# Patient Record
Sex: FEMALE | Race: WHITE | NOT HISPANIC OR LATINO | Employment: FULL TIME | URBAN - NONMETROPOLITAN AREA
[De-identification: names, ages, dates, MRNs, and addresses within clinical notes are randomized per-mention and may not be internally consistent; named-entity substitution may affect disease eponyms.]

---

## 2023-05-02 LAB
CHOLESTEROL (MG/DL) IN SER/PLAS: 216 MG/DL (ref 0–199)
CHOLESTEROL IN HDL (MG/DL) IN SER/PLAS: 58 MG/DL
CHOLESTEROL/HDL RATIO: 3.7
LDL: 122 MG/DL (ref 0–99)
TRIGLYCERIDE (MG/DL) IN SER/PLAS: 181 MG/DL (ref 0–149)
VLDL: 36 MG/DL (ref 0–40)

## 2023-05-19 ENCOUNTER — TELEPHONE (OUTPATIENT)
Dept: PRIMARY CARE | Facility: CLINIC | Age: 53
End: 2023-05-19
Payer: COMMERCIAL

## 2023-05-19 DIAGNOSIS — I10 MILD HYPERTENSION: ICD-10-CM

## 2023-05-19 DIAGNOSIS — J45.909 MILD ASTHMA, UNSPECIFIED WHETHER COMPLICATED, UNSPECIFIED WHETHER PERSISTENT (HHS-HCC): ICD-10-CM

## 2023-05-19 RX ORDER — METOPROLOL SUCCINATE 50 MG/1
TABLET, EXTENDED RELEASE ORAL
COMMUNITY
Start: 2020-09-14 | End: 2023-05-19 | Stop reason: SDUPTHER

## 2023-05-19 RX ORDER — METOPROLOL SUCCINATE 50 MG/1
50 TABLET, EXTENDED RELEASE ORAL 2 TIMES DAILY
Qty: 270 TABLET | Refills: 1 | Status: SHIPPED | OUTPATIENT
Start: 2023-05-19 | End: 2023-06-19 | Stop reason: SDUPTHER

## 2023-05-19 RX ORDER — FLUTICASONE PROPIONATE 50 UG/1
1 POWDER, METERED RESPIRATORY (INHALATION)
Qty: 3 EACH | Refills: 1 | Status: SHIPPED | OUTPATIENT
Start: 2023-05-19 | End: 2023-05-19

## 2023-05-19 RX ORDER — FLUTICASONE PROPIONATE 50 UG/1
POWDER, METERED RESPIRATORY (INHALATION) 2 TIMES DAILY
COMMUNITY
Start: 2020-05-21 | End: 2023-05-19 | Stop reason: SDUPTHER

## 2023-05-23 DIAGNOSIS — F43.0 STRESS RESPONSE: ICD-10-CM

## 2023-05-23 RX ORDER — VENLAFAXINE HYDROCHLORIDE 75 MG/1
1 CAPSULE, EXTENDED RELEASE ORAL DAILY
COMMUNITY
Start: 2022-05-27 | End: 2023-05-23 | Stop reason: SDUPTHER

## 2023-05-23 RX ORDER — VENLAFAXINE HYDROCHLORIDE 75 MG/1
75 CAPSULE, EXTENDED RELEASE ORAL DAILY
Qty: 90 CAPSULE | Refills: 1 | Status: SHIPPED | OUTPATIENT
Start: 2023-05-23 | End: 2023-08-29 | Stop reason: SDUPTHER

## 2023-05-26 DIAGNOSIS — J45.909 MILD ASTHMA, UNSPECIFIED WHETHER COMPLICATED, UNSPECIFIED WHETHER PERSISTENT (HHS-HCC): Primary | ICD-10-CM

## 2023-05-26 RX ORDER — LISINOPRIL 20 MG/1
20 TABLET ORAL DAILY
COMMUNITY
Start: 2022-12-19 | End: 2023-07-10 | Stop reason: SDUPTHER

## 2023-05-26 RX ORDER — LISINOPRIL 10 MG/1
10 TABLET ORAL DAILY
COMMUNITY
Start: 2022-05-12 | End: 2023-06-19 | Stop reason: ALTCHOICE

## 2023-05-26 RX ORDER — FAMOTIDINE 20 MG/1
20 TABLET, FILM COATED ORAL 2 TIMES DAILY
COMMUNITY
End: 2023-12-18 | Stop reason: SDUPTHER

## 2023-05-26 RX ORDER — ALBUTEROL SULFATE 90 UG/1
1 AEROSOL, METERED RESPIRATORY (INHALATION) EVERY 4 HOURS PRN
COMMUNITY

## 2023-05-26 RX ORDER — PHENOL 1.4 %
1 AEROSOL, SPRAY (ML) MUCOUS MEMBRANE DAILY
COMMUNITY
Start: 2022-04-28

## 2023-05-26 RX ORDER — MONTELUKAST SODIUM 10 MG/1
10 TABLET ORAL EVERY EVENING
Qty: 90 TABLET | Refills: 1 | Status: SHIPPED | OUTPATIENT
Start: 2023-05-26 | End: 2023-05-26

## 2023-05-26 RX ORDER — MONTELUKAST SODIUM 10 MG/1
10 TABLET ORAL EVERY EVENING
COMMUNITY
Start: 2020-05-18 | End: 2023-05-26 | Stop reason: SDUPTHER

## 2023-06-15 PROBLEM — N95.1 PERIMENOPAUSAL SYMPTOM: Status: ACTIVE | Noted: 2023-06-15

## 2023-06-15 PROBLEM — N89.8 LEUKORRHEA: Status: ACTIVE | Noted: 2023-06-15

## 2023-06-15 PROBLEM — K21.9 GERD (GASTROESOPHAGEAL REFLUX DISEASE): Status: ACTIVE | Noted: 2023-06-15

## 2023-06-15 PROBLEM — E04.1 THYROID NODULE: Status: ACTIVE | Noted: 2023-06-15

## 2023-06-15 PROBLEM — J45.909 MILD ASTHMA (HHS-HCC): Status: ACTIVE | Noted: 2023-06-15

## 2023-06-15 PROBLEM — R73.9 HYPERGLYCEMIA: Status: ACTIVE | Noted: 2023-06-15

## 2023-06-15 PROBLEM — R09.82 PND (POST-NASAL DRIP): Status: ACTIVE | Noted: 2023-06-15

## 2023-06-15 PROBLEM — I10 MILD HYPERTENSION: Status: ACTIVE | Noted: 2023-06-15

## 2023-06-15 PROBLEM — I49.3 PVC (PREMATURE VENTRICULAR CONTRACTION): Status: ACTIVE | Noted: 2023-06-15

## 2023-06-19 ENCOUNTER — OFFICE VISIT (OUTPATIENT)
Dept: PRIMARY CARE | Facility: CLINIC | Age: 53
End: 2023-06-19
Payer: COMMERCIAL

## 2023-06-19 VITALS
HEIGHT: 65 IN | BODY MASS INDEX: 35.84 KG/M2 | HEART RATE: 71 BPM | OXYGEN SATURATION: 98 % | SYSTOLIC BLOOD PRESSURE: 128 MMHG | DIASTOLIC BLOOD PRESSURE: 74 MMHG | WEIGHT: 215.1 LBS

## 2023-06-19 DIAGNOSIS — E04.1 THYROID NODULE: ICD-10-CM

## 2023-06-19 DIAGNOSIS — N95.1 PERIMENOPAUSAL SYMPTOM: ICD-10-CM

## 2023-06-19 DIAGNOSIS — I10 MILD HYPERTENSION: Primary | ICD-10-CM

## 2023-06-19 DIAGNOSIS — E78.2 MIXED HYPERLIPIDEMIA: ICD-10-CM

## 2023-06-19 DIAGNOSIS — I49.3 PVC (PREMATURE VENTRICULAR CONTRACTION): ICD-10-CM

## 2023-06-19 DIAGNOSIS — R73.9 HYPERGLYCEMIA: ICD-10-CM

## 2023-06-19 PROBLEM — N89.8 LEUKORRHEA: Status: RESOLVED | Noted: 2023-06-15 | Resolved: 2023-06-19

## 2023-06-19 LAB
ANION GAP IN SER/PLAS: 13 MMOL/L (ref 10–20)
CALCIUM (MG/DL) IN SER/PLAS: 9.1 MG/DL (ref 8.6–10.3)
CARBON DIOXIDE, TOTAL (MMOL/L) IN SER/PLAS: 25 MMOL/L (ref 21–32)
CHLORIDE (MMOL/L) IN SER/PLAS: 106 MMOL/L (ref 98–107)
CREATININE (MG/DL) IN SER/PLAS: 0.65 MG/DL (ref 0.5–1.05)
GFR FEMALE: >90 ML/MIN/1.73M2
GLUCOSE (MG/DL) IN SER/PLAS: 106 MG/DL (ref 74–99)
POTASSIUM (MMOL/L) IN SER/PLAS: 4.1 MMOL/L (ref 3.5–5.3)
SODIUM (MMOL/L) IN SER/PLAS: 140 MMOL/L (ref 136–145)
UREA NITROGEN (MG/DL) IN SER/PLAS: 10 MG/DL (ref 6–23)

## 2023-06-19 PROCEDURE — 99213 OFFICE O/P EST LOW 20 MIN: CPT | Performed by: INTERNAL MEDICINE

## 2023-06-19 PROCEDURE — 3074F SYST BP LT 130 MM HG: CPT | Performed by: INTERNAL MEDICINE

## 2023-06-19 PROCEDURE — 1036F TOBACCO NON-USER: CPT | Performed by: INTERNAL MEDICINE

## 2023-06-19 PROCEDURE — 3078F DIAST BP <80 MM HG: CPT | Performed by: INTERNAL MEDICINE

## 2023-06-19 RX ORDER — METOPROLOL SUCCINATE 50 MG/1
TABLET, EXTENDED RELEASE ORAL
Qty: 270 TABLET | Refills: 1 | Status: SHIPPED | OUTPATIENT
Start: 2023-06-19 | End: 2023-11-14 | Stop reason: SDUPTHER

## 2023-06-19 ASSESSMENT — ENCOUNTER SYMPTOMS
NAUSEA: 0
DIARRHEA: 0
PALPITATIONS: 0
ABDOMINAL PAIN: 0
COUGH: 0
SHORTNESS OF BREATH: 0
FATIGUE: 0
WHEEZING: 0
BLOOD IN STOOL: 0
BACK PAIN: 0
CHEST TIGHTNESS: 0
ARTHRALGIAS: 0
VOMITING: 0

## 2023-06-19 ASSESSMENT — PATIENT HEALTH QUESTIONNAIRE - PHQ9
1. LITTLE INTEREST OR PLEASURE IN DOING THINGS: NOT AT ALL
2. FEELING DOWN, DEPRESSED OR HOPELESS: NOT AT ALL
SUM OF ALL RESPONSES TO PHQ9 QUESTIONS 1 AND 2: 0

## 2023-06-19 NOTE — PATIENT INSTRUCTIONS
We will contact you with your hemoglobin A1c result when it comes back  We would like to see you back in approximately 6 months for follow-up and please remember to get fasting lab work prior to that visit.  We will be checking your sugar, kidney profile, lipid profile, and hemoglobin A1c

## 2023-06-19 NOTE — PROGRESS NOTES
Subjective   Patient ID: Tamela Dow is a 53 y.o. female who presents for No chief complaint on file..  HPI  She is here today for her routine checkup.  She is looking well and reports feeling well.  Her blood pressure remains under good control.  She states she has been working on nutrition and exercise.  She is working out with her daughter and she is gradually increasing her stamina and her strength through training.  We also discussed her past medical problems and she continues to follow closely with the endocrinologist for her thyroid nodules.  Everything has remained stable at this point.  She also sees Dr. Pablo for her history of PVCs and so far she is doing well.  We also reviewed her most recent laboratory test results which she was able to get done this morning.  Her fasting glucose was slightly raised at 106 and her hemoglobin A1c is pending.  We also reviewed her medications and she takes the Effexor for perimenopausal symptoms which has been successful.  We decided to make no changes in medication and if everything goes according to plan we will see her back in 6 months for reevaluation  Review of Systems   Constitutional:  Negative for fatigue.   Respiratory:  Negative for cough, chest tightness, shortness of breath and wheezing.    Cardiovascular:  Negative for chest pain, palpitations and leg swelling.   Gastrointestinal:  Negative for abdominal pain, blood in stool, diarrhea, nausea and vomiting.   Musculoskeletal:  Negative for arthralgias and back pain.     Objective   Physical Exam  Vitals and nursing note reviewed.   Constitutional:       General: She is not in acute distress.     Appearance: Normal appearance.   HENT:      Head: Normocephalic and atraumatic.   Eyes:      Conjunctiva/sclera: Conjunctivae normal.   Cardiovascular:      Rate and Rhythm: Normal rate and regular rhythm.      Heart sounds: Normal heart sounds.   Pulmonary:      Effort: No respiratory distress.      Breath sounds:  No wheezing.   Abdominal:      Palpations: Abdomen is soft.      Tenderness: There is no abdominal tenderness. There is no guarding.   Musculoskeletal:         General: No swelling. Normal range of motion.   Skin:     General: Skin is warm and dry.   Neurological:      General: No focal deficit present.      Mental Status: She is alert and oriented to person, place, and time.   Psychiatric:         Behavior: Behavior normal.       Recent Results (from the past 672 hour(s))   Basic Metabolic Panel    Collection Time: 06/19/23 10:47 AM   Result Value Ref Range    Glucose 106 (H) 74 - 99 mg/dL    Sodium 140 136 - 145 mmol/L    Potassium 4.1 3.5 - 5.3 mmol/L    Chloride 106 98 - 107 mmol/L    Bicarbonate 25 21 - 32 mmol/L    Anion Gap 13 10 - 20 mmol/L    Urea Nitrogen 10 6 - 23 mg/dL    Creatinine 0.65 0.50 - 1.05 mg/dL    GFR Female >90 >90 mL/min/1.73m2    Calcium 9.1 8.6 - 10.3 mg/dL       Assessment/Plan   Problem List Items Addressed This Visit          Circulatory    Mild hypertension - Primary     -Blood pressure is currently well controlled  -She is taking metoprolol succinate XL 50 mg 1-1/2 tablets twice daily daily         PVC (premature ventricular contraction)     -Follows with Dr. Pablo            Endocrine/Metabolic    Thyroid nodule     -She follows with endocrinology, Dr. Evelia Hernandez and has serial ultrasounds done            Other    Hyperglycemia     -Fasting glucose is borderline at 106  -Hemoglobin A1c is pending  -She understands that eating healthy, exercising regularly, and trying to get his close to ideal body weight will reduce her risk of diabetes in the future         Relevant Orders    Basic Metabolic Panel    Hemoglobin A1C    Perimenopausal symptom     -Doing well on Effexor Exar 75 mg daily          Other Visit Diagnoses       Mixed hyperlipidemia        Relevant Orders    Lipid Panel               Neena Torres DO

## 2023-06-19 NOTE — ASSESSMENT & PLAN NOTE
-Fasting glucose is borderline at 106  -Hemoglobin A1c is pending  -She understands that eating healthy, exercising regularly, and trying to get his close to ideal body weight will reduce her risk of diabetes in the future

## 2023-06-19 NOTE — ASSESSMENT & PLAN NOTE
-Blood pressure is currently well controlled  -She is taking metoprolol succinate XL 50 mg 1-1/2 tablets twice daily daily

## 2023-06-20 LAB
ESTIMATED AVERAGE GLUCOSE FOR HBA1C: 111 MG/DL
HEMOGLOBIN A1C/HEMOGLOBIN TOTAL IN BLOOD: 5.5 %

## 2023-07-10 DIAGNOSIS — I10 MILD HYPERTENSION: ICD-10-CM

## 2023-07-10 RX ORDER — LISINOPRIL 20 MG/1
20 TABLET ORAL DAILY
Qty: 90 TABLET | Refills: 1 | Status: SHIPPED | OUTPATIENT
Start: 2023-07-10 | End: 2023-07-10

## 2023-08-29 DIAGNOSIS — F43.0 STRESS RESPONSE: ICD-10-CM

## 2023-08-29 RX ORDER — VENLAFAXINE HYDROCHLORIDE 75 MG/1
75 CAPSULE, EXTENDED RELEASE ORAL DAILY
Qty: 90 CAPSULE | Refills: 1 | Status: SHIPPED | OUTPATIENT
Start: 2023-08-29 | End: 2023-08-29

## 2023-10-06 ENCOUNTER — PHARMACY VISIT (OUTPATIENT)
Dept: PHARMACY | Facility: CLINIC | Age: 53
End: 2023-10-06
Payer: COMMERCIAL

## 2023-10-06 PROCEDURE — RXMED WILLOW AMBULATORY MEDICATION CHARGE

## 2023-10-09 ENCOUNTER — LAB (OUTPATIENT)
Dept: LAB | Facility: LAB | Age: 53
End: 2023-10-09
Payer: COMMERCIAL

## 2023-10-09 DIAGNOSIS — E04.2 NONTOXIC MULTINODULAR GOITER: ICD-10-CM

## 2023-10-09 DIAGNOSIS — E04.2 NONTOXIC MULTINODULAR GOITER: Primary | ICD-10-CM

## 2023-10-09 LAB
T4 FREE SERPL-MCNC: 0.68 NG/DL (ref 0.61–1.12)
TSH SERPL-ACNC: 1.22 MIU/L (ref 0.44–3.98)

## 2023-10-09 PROCEDURE — 84439 ASSAY OF FREE THYROXINE: CPT

## 2023-10-09 PROCEDURE — 36415 COLL VENOUS BLD VENIPUNCTURE: CPT

## 2023-10-09 PROCEDURE — 84443 ASSAY THYROID STIM HORMONE: CPT

## 2023-11-14 DIAGNOSIS — I10 MILD HYPERTENSION: ICD-10-CM

## 2023-11-14 RX ORDER — METOPROLOL SUCCINATE 50 MG/1
TABLET, EXTENDED RELEASE ORAL
Qty: 270 TABLET | Refills: 1 | Status: SHIPPED | OUTPATIENT
Start: 2023-11-14 | End: 2023-12-18 | Stop reason: SDUPTHER

## 2023-11-15 ENCOUNTER — PHARMACY VISIT (OUTPATIENT)
Dept: PHARMACY | Facility: CLINIC | Age: 53
End: 2023-11-15
Payer: COMMERCIAL

## 2023-11-15 PROCEDURE — RXMED WILLOW AMBULATORY MEDICATION CHARGE

## 2023-11-20 ENCOUNTER — PHARMACY VISIT (OUTPATIENT)
Dept: PHARMACY | Facility: CLINIC | Age: 53
End: 2023-11-20
Payer: COMMERCIAL

## 2023-11-20 DIAGNOSIS — I10 MILD HYPERTENSION: ICD-10-CM

## 2023-11-20 DIAGNOSIS — J45.909 MILD ASTHMA, UNSPECIFIED WHETHER COMPLICATED, UNSPECIFIED WHETHER PERSISTENT (HHS-HCC): ICD-10-CM

## 2023-11-20 PROCEDURE — RXMED WILLOW AMBULATORY MEDICATION CHARGE

## 2023-11-27 ENCOUNTER — PHARMACY VISIT (OUTPATIENT)
Dept: PHARMACY | Facility: CLINIC | Age: 53
End: 2023-11-27
Payer: COMMERCIAL

## 2023-11-27 PROCEDURE — RXOTC WILLOW AMBULATORY OTC CHARGE

## 2023-11-27 PROCEDURE — RXMED WILLOW AMBULATORY MEDICATION CHARGE

## 2023-11-27 RX ORDER — LISINOPRIL 20 MG/1
20 TABLET ORAL DAILY
Qty: 90 TABLET | Refills: 1 | Status: SHIPPED | OUTPATIENT
Start: 2023-11-27 | End: 2023-12-18 | Stop reason: SDUPTHER

## 2023-11-27 RX ORDER — MONTELUKAST SODIUM 10 MG/1
10 TABLET ORAL EVERY EVENING
Qty: 90 TABLET | Refills: 1 | Status: SHIPPED | OUTPATIENT
Start: 2023-11-27 | End: 2023-12-18 | Stop reason: SDUPTHER

## 2023-12-15 ENCOUNTER — LAB (OUTPATIENT)
Dept: LAB | Facility: LAB | Age: 53
End: 2023-12-15
Payer: COMMERCIAL

## 2023-12-15 DIAGNOSIS — R73.9 HYPERGLYCEMIA: ICD-10-CM

## 2023-12-15 DIAGNOSIS — E78.2 MIXED HYPERLIPIDEMIA: ICD-10-CM

## 2023-12-15 LAB
ANION GAP SERPL CALC-SCNC: 14 MMOL/L (ref 10–20)
BUN SERPL-MCNC: 14 MG/DL (ref 6–23)
CALCIUM SERPL-MCNC: 9.6 MG/DL (ref 8.6–10.3)
CHLORIDE SERPL-SCNC: 106 MMOL/L (ref 98–107)
CHOLEST SERPL-MCNC: 211 MG/DL (ref 0–199)
CHOLESTEROL/HDL RATIO: 4.6
CO2 SERPL-SCNC: 26 MMOL/L (ref 21–32)
CREAT SERPL-MCNC: 0.71 MG/DL (ref 0.5–1.05)
EST. AVERAGE GLUCOSE BLD GHB EST-MCNC: 114 MG/DL
GFR SERPL CREATININE-BSD FRML MDRD: >90 ML/MIN/1.73M*2
GLUCOSE SERPL-MCNC: 108 MG/DL (ref 74–99)
HBA1C MFR BLD: 5.6 %
HDLC SERPL-MCNC: 46 MG/DL
LDLC SERPL CALC-MCNC: 119 MG/DL
NON HDL CHOLESTEROL: 165 MG/DL (ref 0–149)
POTASSIUM SERPL-SCNC: 3.9 MMOL/L (ref 3.5–5.3)
SODIUM SERPL-SCNC: 142 MMOL/L (ref 136–145)
TRIGL SERPL-MCNC: 229 MG/DL (ref 0–149)
VLDL: 46 MG/DL (ref 0–40)

## 2023-12-15 PROCEDURE — 36415 COLL VENOUS BLD VENIPUNCTURE: CPT

## 2023-12-15 PROCEDURE — 80061 LIPID PANEL: CPT

## 2023-12-15 PROCEDURE — 80048 BASIC METABOLIC PNL TOTAL CA: CPT

## 2023-12-15 PROCEDURE — 83036 HEMOGLOBIN GLYCOSYLATED A1C: CPT

## 2023-12-18 ENCOUNTER — OFFICE VISIT (OUTPATIENT)
Dept: PRIMARY CARE | Facility: CLINIC | Age: 53
End: 2023-12-18
Payer: COMMERCIAL

## 2023-12-18 VITALS
OXYGEN SATURATION: 98 % | WEIGHT: 214 LBS | SYSTOLIC BLOOD PRESSURE: 126 MMHG | HEART RATE: 77 BPM | DIASTOLIC BLOOD PRESSURE: 72 MMHG | BODY MASS INDEX: 35.61 KG/M2

## 2023-12-18 DIAGNOSIS — I10 MILD HYPERTENSION: Primary | ICD-10-CM

## 2023-12-18 DIAGNOSIS — K21.9 GASTROESOPHAGEAL REFLUX DISEASE WITHOUT ESOPHAGITIS: ICD-10-CM

## 2023-12-18 DIAGNOSIS — R73.9 HYPERGLYCEMIA: ICD-10-CM

## 2023-12-18 DIAGNOSIS — J45.909 MILD ASTHMA, UNSPECIFIED WHETHER COMPLICATED, UNSPECIFIED WHETHER PERSISTENT (HHS-HCC): ICD-10-CM

## 2023-12-18 DIAGNOSIS — N95.1 PERIMENOPAUSAL SYMPTOM: ICD-10-CM

## 2023-12-18 DIAGNOSIS — E78.2 MIXED HYPERLIPIDEMIA: ICD-10-CM

## 2023-12-18 DIAGNOSIS — F43.0 STRESS RESPONSE: ICD-10-CM

## 2023-12-18 PROCEDURE — 3078F DIAST BP <80 MM HG: CPT | Performed by: INTERNAL MEDICINE

## 2023-12-18 PROCEDURE — 99214 OFFICE O/P EST MOD 30 MIN: CPT | Performed by: INTERNAL MEDICINE

## 2023-12-18 PROCEDURE — RXMED WILLOW AMBULATORY MEDICATION CHARGE

## 2023-12-18 PROCEDURE — 3074F SYST BP LT 130 MM HG: CPT | Performed by: INTERNAL MEDICINE

## 2023-12-18 PROCEDURE — 1036F TOBACCO NON-USER: CPT | Performed by: INTERNAL MEDICINE

## 2023-12-18 RX ORDER — FLUTICASONE PROPIONATE 50 UG/1
POWDER, METERED RESPIRATORY (INHALATION)
Qty: 180 EACH | Refills: 1 | Status: SHIPPED | OUTPATIENT
Start: 2023-12-18 | End: 2024-12-17

## 2023-12-18 RX ORDER — FAMOTIDINE 20 MG/1
20 TABLET, FILM COATED ORAL 2 TIMES DAILY
Qty: 180 TABLET | Refills: 1 | Status: SHIPPED | OUTPATIENT
Start: 2023-12-18

## 2023-12-18 RX ORDER — VENLAFAXINE HYDROCHLORIDE 75 MG/1
CAPSULE, EXTENDED RELEASE ORAL
Qty: 90 CAPSULE | Refills: 1 | Status: SHIPPED | OUTPATIENT
Start: 2023-12-18 | End: 2024-12-17

## 2023-12-18 RX ORDER — MONTELUKAST SODIUM 10 MG/1
10 TABLET ORAL EVERY EVENING
Qty: 90 TABLET | Refills: 1 | Status: SHIPPED | OUTPATIENT
Start: 2023-12-18 | End: 2024-12-17

## 2023-12-18 RX ORDER — LISINOPRIL 20 MG/1
20 TABLET ORAL DAILY
Qty: 90 TABLET | Refills: 1 | Status: SHIPPED | OUTPATIENT
Start: 2023-12-18 | End: 2024-12-17

## 2023-12-18 RX ORDER — METOPROLOL SUCCINATE 50 MG/1
TABLET, EXTENDED RELEASE ORAL
Qty: 270 TABLET | Refills: 1 | Status: SHIPPED | OUTPATIENT
Start: 2023-12-18

## 2023-12-18 RX ORDER — ATORVASTATIN CALCIUM 10 MG/1
10 TABLET, FILM COATED ORAL DAILY
Qty: 30 TABLET | Refills: 5 | Status: SHIPPED | OUTPATIENT
Start: 2023-12-18 | End: 2024-06-17

## 2023-12-18 ASSESSMENT — ENCOUNTER SYMPTOMS
COUGH: 0
NAUSEA: 0
FATIGUE: 0
ABDOMINAL PAIN: 0
WHEEZING: 0
ARTHRALGIAS: 0
BACK PAIN: 0
PALPITATIONS: 0
SHORTNESS OF BREATH: 0
VOMITING: 0
DIARRHEA: 0
BLOOD IN STOOL: 0

## 2023-12-18 ASSESSMENT — PATIENT HEALTH QUESTIONNAIRE - PHQ9
SUM OF ALL RESPONSES TO PHQ9 QUESTIONS 1 AND 2: 0
2. FEELING DOWN, DEPRESSED OR HOPELESS: NOT AT ALL
2. FEELING DOWN, DEPRESSED OR HOPELESS: NOT AT ALL
1. LITTLE INTEREST OR PLEASURE IN DOING THINGS: NOT AT ALL
1. LITTLE INTEREST OR PLEASURE IN DOING THINGS: NOT AT ALL
SUM OF ALL RESPONSES TO PHQ9 QUESTIONS 1 AND 2: 0

## 2023-12-18 NOTE — PROGRESS NOTES
Subjective   Patient ID: Tamela Dow is a 53 y.o. female who presents for Follow-up (6 MO FUV. ).  HPI  She is here today for her routine 6-month checkup.  She is looking well and also reports feeling well.  We did conduct a full review of systems and we also went over the results of recent lab work.  Overall most of her numbers are looking great including her kidney function and her hemoglobin A1c of 5.6.  Talked about the fluctuating numbers with her cholesterol and she will has been putting forth effort to living a healthier lifestyle.  She has really cut back on the sweets during the holiday season and she is also trying to meditate more and exercise.  We talked about her family history of heart disease we talked about her consultation with cardiology.  After further discussion we did decide to start a low-dose of atorvastatin and will do careful monitoring.  Her blood pressure is under good control and otherwise we will provide refills on all of her medications today.  Review of Systems   Constitutional:  Negative for fatigue.   Respiratory:  Negative for cough, shortness of breath and wheezing.    Cardiovascular:  Negative for chest pain, palpitations and leg swelling.   Gastrointestinal:  Negative for abdominal pain, blood in stool, diarrhea, nausea and vomiting.   Musculoskeletal:  Negative for arthralgias and back pain.     Objective   Physical Exam  Vitals and nursing note reviewed.   Constitutional:       General: She is not in acute distress.     Appearance: Normal appearance.   HENT:      Head: Normocephalic and atraumatic.   Eyes:      Conjunctiva/sclera: Conjunctivae normal.   Cardiovascular:      Rate and Rhythm: Normal rate and regular rhythm.      Heart sounds: Normal heart sounds.   Pulmonary:      Effort: No respiratory distress.      Breath sounds: No wheezing.   Abdominal:      Palpations: Abdomen is soft.      Tenderness: There is no abdominal tenderness. There is no guarding.    Musculoskeletal:         General: No swelling. Normal range of motion.   Skin:     General: Skin is warm and dry.   Neurological:      General: No focal deficit present.      Mental Status: She is alert and oriented to person, place, and time.   Psychiatric:         Behavior: Behavior normal.       Recent Results (from the past 672 hour(s))   Basic Metabolic Panel    Collection Time: 12/15/23  9:07 AM   Result Value Ref Range    Glucose 108 (H) 74 - 99 mg/dL    Sodium 142 136 - 145 mmol/L    Potassium 3.9 3.5 - 5.3 mmol/L    Chloride 106 98 - 107 mmol/L    Bicarbonate 26 21 - 32 mmol/L    Anion Gap 14 10 - 20 mmol/L    Urea Nitrogen 14 6 - 23 mg/dL    Creatinine 0.71 0.50 - 1.05 mg/dL    eGFR >90 >60 mL/min/1.73m*2    Calcium 9.6 8.6 - 10.3 mg/dL   Hemoglobin A1C    Collection Time: 12/15/23  9:07 AM   Result Value Ref Range    Hemoglobin A1C 5.6 see below %    Estimated Average Glucose 114 Not Established mg/dL   Lipid Panel    Collection Time: 12/15/23  9:07 AM   Result Value Ref Range    Cholesterol 211 (H) 0 - 199 mg/dL    HDL-Cholesterol 46.0 mg/dL    Cholesterol/HDL Ratio 4.6     LDL Calculated 119 (H) <=99 mg/dL    VLDL 46 (H) 0 - 40 mg/dL    Triglycerides 229 (H) 0 - 149 mg/dL    Non HDL Cholesterol 165 (H) 0 - 149 mg/dL       Assessment/Plan   Problem List Items Addressed This Visit             ICD-10-CM    Stress response F43.0    Relevant Medications    venlafaxine XR (Effexor-XR) 75 mg 24 hr capsule    GERD (gastroesophageal reflux disease) K21.9     -Currently adequately controlled on her famotidine 20 mg twice daily         Relevant Medications    famotidine (Pepcid) 20 mg tablet    Hyperglycemia R73.9    Relevant Orders    Hemoglobin A1C    Mild asthma J45.909    Relevant Medications    fluticasone (Flovent Diskus) 50 mcg/actuation diskus inhaler    montelukast (Singulair) 10 mg tablet    Mild hypertension - Primary I10     -Currently very well-controlled  -She will remain on metoprolol succinate XL  50 mg Bldg. 1 and 1/2 tablets twice daily  -Lisinopril 20 mg daily         Relevant Medications    metoprolol succinate XL (Toprol-XL) 50 mg 24 hr tablet    lisinopril 20 mg tablet    Other Relevant Orders    Basic Metabolic Panel    Perimenopausal symptom N95.1     -Doing very well on your Effexor XR 75 mg daily         Mixed hyperlipidemia E78.2     -With family history of heart disease we decided to start a low-dose of atorvastatin 10 mg daily  -Just prior to her next follow-up visit she will have a fasting lipid profile         Relevant Medications    atorvastatin (Lipitor) 10 mg tablet    Other Relevant Orders    Lipid Panel          Neena Torres, DO

## 2023-12-18 NOTE — ASSESSMENT & PLAN NOTE
-Currently very well-controlled  -She will remain on metoprolol succinate XL 50 mg Bldg. 1 and 1/2 tablets twice daily  -Lisinopril 20 mg daily

## 2023-12-18 NOTE — PATIENT INSTRUCTIONS
As we discussed we went ahead and sent refills on all of your prescription medications.  We also sent a new prescription for atorvastatin 10 mg daily.  Please take this once daily as directed and please call if you have any problems.  We look forward to seeing you back in 6 months and just prior to that visit you will go for fasting lab work which will also include your cholesterol profile.

## 2023-12-18 NOTE — ASSESSMENT & PLAN NOTE
-With family history of heart disease we decided to start a low-dose of atorvastatin 10 mg daily  -Just prior to her next follow-up visit she will have a fasting lipid profile

## 2023-12-19 ENCOUNTER — PHARMACY VISIT (OUTPATIENT)
Dept: PHARMACY | Facility: CLINIC | Age: 53
End: 2023-12-19
Payer: COMMERCIAL

## 2024-01-15 ENCOUNTER — APPOINTMENT (OUTPATIENT)
Dept: OBSTETRICS AND GYNECOLOGY | Facility: CLINIC | Age: 54
End: 2024-01-15
Payer: COMMERCIAL

## 2024-01-16 PROCEDURE — RXMED WILLOW AMBULATORY MEDICATION CHARGE

## 2024-01-18 ENCOUNTER — OFFICE VISIT (OUTPATIENT)
Dept: OBSTETRICS AND GYNECOLOGY | Facility: CLINIC | Age: 54
End: 2024-01-18
Payer: COMMERCIAL

## 2024-01-18 VITALS
WEIGHT: 212.4 LBS | SYSTOLIC BLOOD PRESSURE: 124 MMHG | HEIGHT: 65 IN | BODY MASS INDEX: 35.39 KG/M2 | DIASTOLIC BLOOD PRESSURE: 70 MMHG

## 2024-01-18 DIAGNOSIS — Z12.31 ENCOUNTER FOR SCREENING MAMMOGRAM FOR BREAST CANCER: ICD-10-CM

## 2024-01-18 PROCEDURE — 3078F DIAST BP <80 MM HG: CPT | Performed by: REGISTERED NURSE

## 2024-01-18 PROCEDURE — 99396 PREV VISIT EST AGE 40-64: CPT | Performed by: REGISTERED NURSE

## 2024-01-18 PROCEDURE — 1036F TOBACCO NON-USER: CPT | Performed by: REGISTERED NURSE

## 2024-01-18 PROCEDURE — 3074F SYST BP LT 130 MM HG: CPT | Performed by: REGISTERED NURSE

## 2024-01-18 ASSESSMENT — ENCOUNTER SYMPTOMS
CONSTITUTIONAL NEGATIVE: 1
RESPIRATORY NEGATIVE: 1

## 2024-01-18 NOTE — PROGRESS NOTES
Subjective   Patient ID: Tamela Dow is a 53 y.o. female who presents for Gynecologic Exam (Patient is here for yearly exam. Patient does do regular self breast exams and has no concerns at this time. LMP: Hysterectomy./).  Gynecologic Exam      Pt. Presents for annual exam. Hx of LEEP, pt. Reports part of cervix remains after hysterectomy. Normal pap and co-testing 1/2023, due again 1/2028. Had colonoscopy, due for mammogram. States no other complaints or concerns   Review of Systems   Constitutional: Negative.    Respiratory: Negative.         Objective   Physical Exam  Chest:   Breasts:     Right: Normal.      Left: Normal.         Assessment/Plan        Schedule mammogram  RTO annual exam and PRN    Monika Johnson, APRN-CNM, APRN-CNP, DNP 01/18/24 2:26 PM

## 2024-01-19 ENCOUNTER — PHARMACY VISIT (OUTPATIENT)
Dept: PHARMACY | Facility: CLINIC | Age: 54
End: 2024-01-19
Payer: COMMERCIAL

## 2024-01-19 PROCEDURE — RXOTC WILLOW AMBULATORY OTC CHARGE

## 2024-02-02 ENCOUNTER — PHARMACY VISIT (OUTPATIENT)
Dept: PHARMACY | Facility: CLINIC | Age: 54
End: 2024-02-02

## 2024-02-02 PROCEDURE — RXOTC WILLOW AMBULATORY OTC CHARGE

## 2024-02-12 PROCEDURE — RXMED WILLOW AMBULATORY MEDICATION CHARGE

## 2024-02-13 ENCOUNTER — PHARMACY VISIT (OUTPATIENT)
Dept: PHARMACY | Facility: CLINIC | Age: 54
End: 2024-02-13
Payer: COMMERCIAL

## 2024-02-13 PROCEDURE — RXMED WILLOW AMBULATORY MEDICATION CHARGE

## 2024-02-16 ENCOUNTER — PHARMACY VISIT (OUTPATIENT)
Dept: PHARMACY | Facility: CLINIC | Age: 54
End: 2024-02-16
Payer: COMMERCIAL

## 2024-02-16 PROCEDURE — RXMED WILLOW AMBULATORY MEDICATION CHARGE

## 2024-02-16 PROCEDURE — RXOTC WILLOW AMBULATORY OTC CHARGE

## 2024-02-21 PROCEDURE — RXMED WILLOW AMBULATORY MEDICATION CHARGE

## 2024-02-23 ENCOUNTER — PHARMACY VISIT (OUTPATIENT)
Dept: PHARMACY | Facility: CLINIC | Age: 54
End: 2024-02-23
Payer: COMMERCIAL

## 2024-03-04 ENCOUNTER — HOSPITAL ENCOUNTER (OUTPATIENT)
Dept: RADIOLOGY | Facility: HOSPITAL | Age: 54
Discharge: HOME | End: 2024-03-04
Payer: COMMERCIAL

## 2024-03-04 DIAGNOSIS — Z12.31 ENCOUNTER FOR SCREENING MAMMOGRAM FOR BREAST CANCER: ICD-10-CM

## 2024-03-04 PROCEDURE — 77067 SCR MAMMO BI INCL CAD: CPT | Performed by: RADIOLOGY

## 2024-03-04 PROCEDURE — 77067 SCR MAMMO BI INCL CAD: CPT

## 2024-03-04 PROCEDURE — 77063 BREAST TOMOSYNTHESIS BI: CPT | Performed by: RADIOLOGY

## 2024-03-12 ENCOUNTER — PHARMACY VISIT (OUTPATIENT)
Dept: PHARMACY | Facility: CLINIC | Age: 54
End: 2024-03-12
Payer: COMMERCIAL

## 2024-03-12 PROCEDURE — RXMED WILLOW AMBULATORY MEDICATION CHARGE

## 2024-03-14 PROCEDURE — RXMED WILLOW AMBULATORY MEDICATION CHARGE

## 2024-03-15 ENCOUNTER — PHARMACY VISIT (OUTPATIENT)
Dept: PHARMACY | Facility: CLINIC | Age: 54
End: 2024-03-15
Payer: COMMERCIAL

## 2024-04-04 PROCEDURE — RXMED WILLOW AMBULATORY MEDICATION CHARGE

## 2024-04-05 ENCOUNTER — PHARMACY VISIT (OUTPATIENT)
Dept: PHARMACY | Facility: CLINIC | Age: 54
End: 2024-04-05
Payer: COMMERCIAL

## 2024-04-15 ENCOUNTER — PHARMACY VISIT (OUTPATIENT)
Dept: PHARMACY | Facility: CLINIC | Age: 54
End: 2024-04-15
Payer: COMMERCIAL

## 2024-04-15 PROCEDURE — RXOTC WILLOW AMBULATORY OTC CHARGE

## 2024-04-15 PROCEDURE — RXMED WILLOW AMBULATORY MEDICATION CHARGE

## 2024-04-30 ENCOUNTER — HOSPITAL ENCOUNTER (EMERGENCY)
Facility: HOSPITAL | Age: 54
Discharge: HOME | End: 2024-04-30
Payer: COMMERCIAL

## 2024-04-30 ENCOUNTER — APPOINTMENT (OUTPATIENT)
Dept: RADIOLOGY | Facility: HOSPITAL | Age: 54
End: 2024-04-30
Payer: COMMERCIAL

## 2024-04-30 VITALS
HEART RATE: 75 BPM | SYSTOLIC BLOOD PRESSURE: 174 MMHG | HEIGHT: 65 IN | DIASTOLIC BLOOD PRESSURE: 102 MMHG | RESPIRATION RATE: 18 BRPM | OXYGEN SATURATION: 95 % | TEMPERATURE: 99.9 F | BODY MASS INDEX: 33.32 KG/M2 | WEIGHT: 200 LBS

## 2024-04-30 DIAGNOSIS — S40.022A ARM CONTUSION, LEFT, INITIAL ENCOUNTER: Primary | ICD-10-CM

## 2024-04-30 PROCEDURE — 73130 X-RAY EXAM OF HAND: CPT | Mod: LEFT SIDE | Performed by: RADIOLOGY

## 2024-04-30 PROCEDURE — 73130 X-RAY EXAM OF HAND: CPT | Mod: LT

## 2024-04-30 PROCEDURE — 73090 X-RAY EXAM OF FOREARM: CPT | Mod: LT

## 2024-04-30 PROCEDURE — 73090 X-RAY EXAM OF FOREARM: CPT | Mod: LEFT SIDE | Performed by: RADIOLOGY

## 2024-04-30 PROCEDURE — 99284 EMERGENCY DEPT VISIT MOD MDM: CPT

## 2024-04-30 ASSESSMENT — PAIN - FUNCTIONAL ASSESSMENT: PAIN_FUNCTIONAL_ASSESSMENT: 0-10

## 2024-04-30 ASSESSMENT — PAIN DESCRIPTION - LOCATION: LOCATION: HAND

## 2024-04-30 ASSESSMENT — PAIN DESCRIPTION - PAIN TYPE: TYPE: ACUTE PAIN

## 2024-04-30 ASSESSMENT — COLUMBIA-SUICIDE SEVERITY RATING SCALE - C-SSRS
1. IN THE PAST MONTH, HAVE YOU WISHED YOU WERE DEAD OR WISHED YOU COULD GO TO SLEEP AND NOT WAKE UP?: NO
2. HAVE YOU ACTUALLY HAD ANY THOUGHTS OF KILLING YOURSELF?: NO
6. HAVE YOU EVER DONE ANYTHING, STARTED TO DO ANYTHING, OR PREPARED TO DO ANYTHING TO END YOUR LIFE?: NO

## 2024-04-30 ASSESSMENT — PAIN DESCRIPTION - DESCRIPTORS: DESCRIPTORS: THROBBING;ACHING;SHARP

## 2024-04-30 ASSESSMENT — PAIN DESCRIPTION - ORIENTATION: ORIENTATION: LEFT

## 2024-04-30 ASSESSMENT — PAIN SCALES - GENERAL: PAINLEVEL_OUTOF10: 5 - MODERATE PAIN
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
## 2024-05-02 ENCOUNTER — OFFICE VISIT (OUTPATIENT)
Dept: CARDIOLOGY | Facility: CLINIC | Age: 54
End: 2024-05-02
Payer: COMMERCIAL

## 2024-05-02 VITALS
HEART RATE: 74 BPM | BODY MASS INDEX: 35.32 KG/M2 | SYSTOLIC BLOOD PRESSURE: 130 MMHG | OXYGEN SATURATION: 96 % | WEIGHT: 212 LBS | DIASTOLIC BLOOD PRESSURE: 90 MMHG | HEIGHT: 65 IN

## 2024-05-02 DIAGNOSIS — I11.9 BENIGN HYPERTENSIVE HEART DISEASE WITHOUT CONGESTIVE HEART FAILURE: Primary | ICD-10-CM

## 2024-05-02 DIAGNOSIS — I49.3 PVC (PREMATURE VENTRICULAR CONTRACTION): ICD-10-CM

## 2024-05-02 PROCEDURE — 99213 OFFICE O/P EST LOW 20 MIN: CPT | Performed by: STUDENT IN AN ORGANIZED HEALTH CARE EDUCATION/TRAINING PROGRAM

## 2024-05-02 PROCEDURE — 3075F SYST BP GE 130 - 139MM HG: CPT | Performed by: STUDENT IN AN ORGANIZED HEALTH CARE EDUCATION/TRAINING PROGRAM

## 2024-05-02 PROCEDURE — 93000 ELECTROCARDIOGRAM COMPLETE: CPT | Performed by: STUDENT IN AN ORGANIZED HEALTH CARE EDUCATION/TRAINING PROGRAM

## 2024-05-02 PROCEDURE — 1036F TOBACCO NON-USER: CPT | Performed by: STUDENT IN AN ORGANIZED HEALTH CARE EDUCATION/TRAINING PROGRAM

## 2024-05-02 PROCEDURE — 3080F DIAST BP >= 90 MM HG: CPT | Performed by: STUDENT IN AN ORGANIZED HEALTH CARE EDUCATION/TRAINING PROGRAM

## 2024-05-02 NOTE — PROGRESS NOTES
Cardiology Subsequent Encounter Clinic Note  Name: Tamela Dow  MRN: [id number]  : 1970    CC: PVC    Active Issues:  Tamela Dow is a 54 y.o. female with a past medical history of asthma here for the following complaints     Problem #1 ventricular premature contractions  -On the monitor dated 2021 patient did have a 3% incidence of PVCs  -PVCs seem to correlate with symptoms  -Echocardiogram done in 2021 is negative for any structural heart defects.  -A nuclear stress test (pharmacological) was negative for any perfusion defects.  -Following initiation of metoprolol 75 mg succinate patient symptoms have completely resolved.     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 Denies any orthopnea/PND/lower extremity edema. Denies any dizziness or lightheadedness.         Past Medical History  Past Medical History:   Diagnosis Date    Abnormal Pap smear of cervix     Asymptomatic menopausal state     History of menopause    Disease of thyroid gland     Other conditions influencing health status     History of pregnancy    Other conditions influencing health status     Menarche    Other specified postprocedural states     History of Papanicolaou smear    Personal history of other diseases of the musculoskeletal system and connective tissue     History of scoliosis    Personal history of other medical treatment     History of mammogram       Past Surgical History  Past Surgical History:   Procedure Laterality Date    CERVICAL BIOPSY  W/ LOOP ELECTRODE EXCISION      COLONOSCOPY      HYSTERECTOMY  2003    OTHER SURGICAL HISTORY  2019    Hydatidiform mole removal       Medications  Current Outpatient Medications on File Prior to Visit  "  Medication Sig Dispense Refill    albuterol 90 mcg/actuation inhaler Inhale 1 puff every 4 hours if needed for wheezing or shortness of breath.      atorvastatin (Lipitor) 10 mg tablet Take 1 tablet (10 mg) by mouth once daily. 30 tablet 5    famotidine (Pepcid) 20 mg tablet Take 1 tablet (20 mg) by mouth 2 times a day. 180 tablet 1    fluticasone (Flovent Diskus) 50 mcg/actuation diskus inhaler INHALE 1 PUFF 2 TIMES A  each 1    lisinopril 20 mg tablet TAKE 1 TABLET BY MOUTH EVERY DAY 90 tablet 1    metoprolol succinate XL (Toprol-XL) 50 mg 24 hr tablet Take 1-1/2 tablet twice daily 270 tablet 1    montelukast (Singulair) 10 mg tablet TAKE 1 TABLET BY MOUTH EVERY DAY IN THE EVENING 90 tablet 1    multivit-iron-minerals-folic acid (Adults 50 Plus) 0.4 mg-300 mcg- 250 mcg tab Take 1 tablet by mouth once daily.      venlafaxine XR (Effexor-XR) 75 mg 24 hr capsule TAKE 1 CAPSULE (75 MG) BY MOUTH ONCE DAILY. 90 capsule 1    zoster vaccine-recombinant adjuvanted (Shingrix) 50 mcg/0.5 mL vaccine Inject into the muscle. REPEAT IN 2 TO 6 MONTHS 1 each 1     No current facility-administered medications on file prior to visit.       Allergies  Allergies   Allergen Reactions    Adhesive Tape-Silicones Unknown    Penicillins Unknown    Sulfa (Sulfonamide Antibiotics) Hives       Social History  Social History     Tobacco Use    Smoking status: Never    Smokeless tobacco: Never   Substance Use Topics    Alcohol use: Never    Drug use: Never       Family History  Family History   Problem Relation Name Age of Onset    Hyperlipidemia Mother Mom     Hypertension Mother Mom     Depression Mother Mom     Hypertension Father      Heart attack Father         Physical Examination  Vitals: /90   Pulse 74   Ht 1.651 m (5' 5\")   Wt 96.2 kg (212 lb)   SpO2 96%   BMI 35.28 kg/m²   General: awake, alert and oriented. No acute distress.   Skin: Skin is warm, dry and intact without rashes or lesions. Appropriate color for " ethnicity. Nail beds pink with no cyanosis or clubbing  HEENT: normocephalic, atraumatic; conjunctivae are clear without exudates or hemorrhage. Sclera is non-icteric. Eyelids are normal in appearance without swelling or lesions. Hearing intact. Nares are patent bilaterally. Moist mucous membranes.   Cardiovascular: Regular. No murmurs, gallops, or rubs are auscultated. S1 and S2 are heard and are of normal intensity. No JVD, no carotid bruits  Respiratory: Thorax symmetric. CTAB, breath sounds vesicular. No crackles, wheezes or ronchi.   Gastrointestinal: soft, non-distended, BS + x 4  Genitourinary: exam deferred  Musculoskeletal: moves all extremities  Extremities: pulses palpable bilaterally; no swelling or erythema; no edema  Neurological: alert & oriented x 3; no focal deficits  Psychiatric: appropriate mood and affect       Labs/Imaging/Procedures    Lab Results   Component Value Date     12/15/2023    K 3.9 12/15/2023    CREATININE 0.71 12/15/2023    CREATININE 0.65 06/19/2023    CREATININE 0.65 12/16/2022    CREATININE 0.72 05/10/2022    BUN 14 12/15/2023    CALCIUM 9.6 12/15/2023    LDLF 122 (H) 05/02/2023     No echocardiogram results found for the past 12 months    Echo 2/2021   1. The left ventricular systolic function is normal with a 55% estimated ejection fraction.   2. Bubble study was negative for any right-to-left shunt.    Segun Dow is a 54 y.o. female with a past medical history of asthma here for the following complaints     Problem #1 ventricular premature contractions  -On the monitor dated 1/7/2021 the PVC burden is 3%, and seems to correlate with symptoms  -No perfusion defect seen on stress test, echocardiogram negative for any structural heart defects  -PVCs seems adequately suppressed with Toprol. We will continue 25 mg at this point.     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

## 2024-05-10 ENCOUNTER — PHARMACY VISIT (OUTPATIENT)
Dept: PHARMACY | Facility: CLINIC | Age: 54
End: 2024-05-10
Payer: COMMERCIAL

## 2024-05-10 PROCEDURE — RXMED WILLOW AMBULATORY MEDICATION CHARGE

## 2024-05-13 ENCOUNTER — HOSPITAL ENCOUNTER (OUTPATIENT)
Dept: RADIOLOGY | Facility: HOSPITAL | Age: 54
Discharge: HOME | End: 2024-05-13
Payer: COMMERCIAL

## 2024-05-13 DIAGNOSIS — I11.9 BENIGN HYPERTENSIVE HEART DISEASE WITHOUT CONGESTIVE HEART FAILURE: ICD-10-CM

## 2024-05-13 DIAGNOSIS — I49.3 PVC (PREMATURE VENTRICULAR CONTRACTION): ICD-10-CM

## 2024-05-13 PROCEDURE — 75571 CT HRT W/O DYE W/CA TEST: CPT

## 2024-05-14 ENCOUNTER — TELEPHONE (OUTPATIENT)
Dept: CARDIOLOGY | Facility: CLINIC | Age: 54
End: 2024-05-14
Payer: COMMERCIAL

## 2024-05-14 PROCEDURE — RXMED WILLOW AMBULATORY MEDICATION CHARGE

## 2024-05-14 NOTE — TELEPHONE ENCOUNTER
----- Message from Marybeth Viera CMA sent at 5/14/2024  4:15 PM EDT -----  Pt has been notified.   ----- Message -----  From: Quan Pablo MD  Sent: 5/14/2024   4:13 PM EDT  To: [facility] Clinical Support Staff    Calcium score 0.  No action required.  Some changes in her lungs noted; do not appear particularly concerning but she should discuss this further with her PCP

## 2024-05-15 PROCEDURE — RXMED WILLOW AMBULATORY MEDICATION CHARGE

## 2024-05-17 ENCOUNTER — PHARMACY VISIT (OUTPATIENT)
Dept: PHARMACY | Facility: CLINIC | Age: 54
End: 2024-05-17
Payer: COMMERCIAL

## 2024-05-22 PROCEDURE — RXMED WILLOW AMBULATORY MEDICATION CHARGE

## 2024-05-25 ENCOUNTER — PHARMACY VISIT (OUTPATIENT)
Dept: PHARMACY | Facility: CLINIC | Age: 54
End: 2024-05-25
Payer: COMMERCIAL

## 2024-05-30 ENCOUNTER — PHARMACY VISIT (OUTPATIENT)
Dept: PHARMACY | Facility: CLINIC | Age: 54
End: 2024-05-30

## 2024-05-30 PROCEDURE — RXOTC WILLOW AMBULATORY OTC CHARGE

## 2024-06-03 ENCOUNTER — PHARMACY VISIT (OUTPATIENT)
Dept: PHARMACY | Facility: CLINIC | Age: 54
End: 2024-06-03
Payer: COMMERCIAL

## 2024-06-11 DIAGNOSIS — K21.9 GASTROESOPHAGEAL REFLUX DISEASE WITHOUT ESOPHAGITIS: ICD-10-CM

## 2024-06-11 DIAGNOSIS — E78.2 MIXED HYPERLIPIDEMIA: ICD-10-CM

## 2024-06-11 PROCEDURE — RXMED WILLOW AMBULATORY MEDICATION CHARGE

## 2024-06-11 RX ORDER — ATORVASTATIN CALCIUM 10 MG/1
10 TABLET, FILM COATED ORAL DAILY
Qty: 30 TABLET | Refills: 5 | Status: SHIPPED | OUTPATIENT
Start: 2024-06-11 | End: 2024-12-08

## 2024-06-11 RX ORDER — FAMOTIDINE 20 MG/1
20 TABLET, FILM COATED ORAL 2 TIMES DAILY
Qty: 180 TABLET | Refills: 1 | Status: SHIPPED | OUTPATIENT
Start: 2024-06-11

## 2024-06-14 ENCOUNTER — LAB (OUTPATIENT)
Dept: LAB | Facility: LAB | Age: 54
End: 2024-06-14
Payer: COMMERCIAL

## 2024-06-14 ENCOUNTER — PHARMACY VISIT (OUTPATIENT)
Dept: PHARMACY | Facility: CLINIC | Age: 54
End: 2024-06-14
Payer: COMMERCIAL

## 2024-06-14 DIAGNOSIS — R73.9 HYPERGLYCEMIA: ICD-10-CM

## 2024-06-14 DIAGNOSIS — I10 MILD HYPERTENSION: ICD-10-CM

## 2024-06-14 DIAGNOSIS — E78.2 MIXED HYPERLIPIDEMIA: ICD-10-CM

## 2024-06-14 LAB
ANION GAP SERPL CALC-SCNC: 13 MMOL/L (ref 10–20)
BUN SERPL-MCNC: 14 MG/DL (ref 6–23)
CALCIUM SERPL-MCNC: 9.4 MG/DL (ref 8.6–10.3)
CHLORIDE SERPL-SCNC: 106 MMOL/L (ref 98–107)
CHOLEST SERPL-MCNC: 158 MG/DL (ref 0–199)
CHOLESTEROL/HDL RATIO: 3
CO2 SERPL-SCNC: 26 MMOL/L (ref 21–32)
CREAT SERPL-MCNC: 0.71 MG/DL (ref 0.5–1.05)
EGFRCR SERPLBLD CKD-EPI 2021: >90 ML/MIN/1.73M*2
EST. AVERAGE GLUCOSE BLD GHB EST-MCNC: 117 MG/DL
GLUCOSE SERPL-MCNC: 102 MG/DL (ref 74–99)
HBA1C MFR BLD: 5.7 %
HDLC SERPL-MCNC: 53 MG/DL
LDLC SERPL CALC-MCNC: 69 MG/DL
NON HDL CHOLESTEROL: 105 MG/DL (ref 0–149)
POTASSIUM SERPL-SCNC: 4.5 MMOL/L (ref 3.5–5.3)
SODIUM SERPL-SCNC: 140 MMOL/L (ref 136–145)
TRIGL SERPL-MCNC: 179 MG/DL (ref 0–149)
VLDL: 36 MG/DL (ref 0–40)

## 2024-06-14 PROCEDURE — 36415 COLL VENOUS BLD VENIPUNCTURE: CPT

## 2024-06-14 PROCEDURE — 83036 HEMOGLOBIN GLYCOSYLATED A1C: CPT

## 2024-06-14 PROCEDURE — 80061 LIPID PANEL: CPT

## 2024-06-14 PROCEDURE — 80048 BASIC METABOLIC PNL TOTAL CA: CPT

## 2024-06-17 ENCOUNTER — APPOINTMENT (OUTPATIENT)
Dept: PRIMARY CARE | Facility: CLINIC | Age: 54
End: 2024-06-17
Payer: COMMERCIAL

## 2024-06-17 VITALS
DIASTOLIC BLOOD PRESSURE: 82 MMHG | WEIGHT: 216 LBS | OXYGEN SATURATION: 98 % | HEART RATE: 71 BPM | SYSTOLIC BLOOD PRESSURE: 136 MMHG | BODY MASS INDEX: 35.94 KG/M2

## 2024-06-17 DIAGNOSIS — J45.909 MILD ASTHMA, UNSPECIFIED WHETHER COMPLICATED, UNSPECIFIED WHETHER PERSISTENT (HHS-HCC): ICD-10-CM

## 2024-06-17 DIAGNOSIS — K21.9 GASTROESOPHAGEAL REFLUX DISEASE WITHOUT ESOPHAGITIS: ICD-10-CM

## 2024-06-17 DIAGNOSIS — F43.0 STRESS RESPONSE: ICD-10-CM

## 2024-06-17 DIAGNOSIS — E78.2 MIXED HYPERLIPIDEMIA: ICD-10-CM

## 2024-06-17 DIAGNOSIS — R73.9 HYPERGLYCEMIA: Primary | ICD-10-CM

## 2024-06-17 DIAGNOSIS — I10 MILD HYPERTENSION: ICD-10-CM

## 2024-06-17 PROBLEM — E04.1 THYROID NODULE: Status: RESOLVED | Noted: 2023-06-15 | Resolved: 2024-06-17

## 2024-06-17 PROCEDURE — 99214 OFFICE O/P EST MOD 30 MIN: CPT | Performed by: INTERNAL MEDICINE

## 2024-06-17 PROCEDURE — 3075F SYST BP GE 130 - 139MM HG: CPT | Performed by: INTERNAL MEDICINE

## 2024-06-17 PROCEDURE — 3079F DIAST BP 80-89 MM HG: CPT | Performed by: INTERNAL MEDICINE

## 2024-06-17 PROCEDURE — 1036F TOBACCO NON-USER: CPT | Performed by: INTERNAL MEDICINE

## 2024-06-17 RX ORDER — ATORVASTATIN CALCIUM 10 MG/1
10 TABLET, FILM COATED ORAL DAILY
Qty: 90 TABLET | Refills: 1 | Status: SHIPPED | OUTPATIENT
Start: 2024-06-17 | End: 2024-12-14

## 2024-06-17 RX ORDER — VENLAFAXINE HYDROCHLORIDE 75 MG/1
CAPSULE, EXTENDED RELEASE ORAL
Qty: 90 CAPSULE | Refills: 1 | Status: SHIPPED | OUTPATIENT
Start: 2024-06-17 | End: 2025-06-17

## 2024-06-17 RX ORDER — FAMOTIDINE 20 MG/1
20 TABLET, FILM COATED ORAL 2 TIMES DAILY
Qty: 180 TABLET | Refills: 1 | Status: SHIPPED | OUTPATIENT
Start: 2024-06-17

## 2024-06-17 RX ORDER — MONTELUKAST SODIUM 10 MG/1
10 TABLET ORAL EVERY EVENING
Qty: 90 TABLET | Refills: 1 | Status: SHIPPED | OUTPATIENT
Start: 2024-06-17 | End: 2025-06-17

## 2024-06-17 RX ORDER — LISINOPRIL 20 MG/1
20 TABLET ORAL DAILY
Qty: 90 TABLET | Refills: 1 | Status: SHIPPED | OUTPATIENT
Start: 2024-06-17 | End: 2025-06-17

## 2024-06-17 RX ORDER — METOPROLOL SUCCINATE 50 MG/1
TABLET, EXTENDED RELEASE ORAL
Qty: 270 TABLET | Refills: 1 | Status: SHIPPED | OUTPATIENT
Start: 2024-06-17

## 2024-06-17 ASSESSMENT — ENCOUNTER SYMPTOMS
COUGH: 0
BACK PAIN: 0
WHEEZING: 0
NAUSEA: 0
VOMITING: 0
ARTHRALGIAS: 0
BLOOD IN STOOL: 0
ABDOMINAL PAIN: 0
PALPITATIONS: 0
SHORTNESS OF BREATH: 0
DIARRHEA: 0
FATIGUE: 0

## 2024-06-17 NOTE — PROGRESS NOTES
Subjective   Patient ID: Tamela Dow is a 54 y.o. female who presents for Follow-up (6 month).  HPI  She is here today for a routine 6-month checkup.  When she arrived her blood pressure was a bit generous but we are reminded that it is over 90 degrees outside and she was carrying to get into her appointment.  She does check her blood pressure regularly and she reports getting systolics in the 120s to 130s and diastolics in the 70s to 80s.  After sitting for a while her blood pressure did come down.  We did conduct a review of systems and we also went over the results of recent lab work.  Overall I am pleased with her numbers.  Her cholesterol profile is excellent and much improved from her previous values.  She is tolerating her atorvastatin well.  Her blood glucose was slightly raised at 102 with a hemoglobin A1c of 5.7.  We have discussed the notion of a prediabetic state and she understands that exercise and weight loss will drastically reduce her risk of diabetes.  She has joined a gym with her daughter and plans to go regularly.  We also discussed her asthma briefly which has been under good control as well as her acid reflux.  She also states she has derived benefit from using the Effexor for her hot flashes.  We also briefly discussed her history of thyroid nodules and she was seen in consultation by a endocrinologist in [place].  They felt that the nodules have been present and stable for 7 years and no further action was needed unless she develops symptoms.  We are providing refills today and if everything goes according to plan we will see her back in 6 months for reevaluation.  Review of Systems   Constitutional:  Negative for fatigue.   Respiratory:  Negative for cough, shortness of breath and wheezing.    Cardiovascular:  Negative for chest pain, palpitations and leg swelling.   Gastrointestinal:  Negative for abdominal pain, blood in stool, diarrhea, nausea and vomiting.   Musculoskeletal:  Negative  for arthralgias and back pain.     Objective   Physical Exam  Vitals and nursing note reviewed.   Constitutional:       General: She is not in acute distress.     Appearance: Normal appearance.   HENT:      Head: Normocephalic and atraumatic.   Eyes:      Conjunctiva/sclera: Conjunctivae normal.   Cardiovascular:      Rate and Rhythm: Normal rate and regular rhythm.      Heart sounds: Normal heart sounds.   Pulmonary:      Effort: No respiratory distress.      Breath sounds: No wheezing.   Abdominal:      Palpations: Abdomen is soft.      Tenderness: There is no abdominal tenderness. There is no guarding.   Musculoskeletal:         General: No swelling. Normal range of motion.   Skin:     General: Skin is warm and dry.   Neurological:      General: No focal deficit present.      Mental Status: She is alert and oriented to person, place, and time.   Psychiatric:         Behavior: Behavior normal.       Recent Results (from the past 672 hour(s))   Basic Metabolic Panel    Collection Time: 06/14/24  8:25 AM   Result Value Ref Range    Glucose 102 (H) 74 - 99 mg/dL    Sodium 140 136 - 145 mmol/L    Potassium 4.5 3.5 - 5.3 mmol/L    Chloride 106 98 - 107 mmol/L    Bicarbonate 26 21 - 32 mmol/L    Anion Gap 13 10 - 20 mmol/L    Urea Nitrogen 14 6 - 23 mg/dL    Creatinine 0.71 0.50 - 1.05 mg/dL    eGFR >90 >60 mL/min/1.73m*2    Calcium 9.4 8.6 - 10.3 mg/dL   Hemoglobin A1C    Collection Time: 06/14/24  8:25 AM   Result Value Ref Range    Hemoglobin A1C 5.7 (H) see below %    Estimated Average Glucose 117 Not Established mg/dL   Lipid Panel    Collection Time: 06/14/24  8:25 AM   Result Value Ref Range    Cholesterol 158 0 - 199 mg/dL    HDL-Cholesterol 53.0 mg/dL    Cholesterol/HDL Ratio 3.0     LDL Calculated 69 <=99 mg/dL    VLDL 36 0 - 40 mg/dL    Triglycerides 179 (H) 0 - 149 mg/dL    Non HDL Cholesterol 105 0 - 149 mg/dL       Assessment/Plan   Problem List Items Addressed This Visit             ICD-10-CM    Stress  response F43.0    Relevant Medications    venlafaxine XR (Effexor-XR) 75 mg 24 hr capsule    GERD (gastroesophageal reflux disease) K21.9     -Currently adequately controlled on Pepcid         Relevant Medications    famotidine (Pepcid) 20 mg tablet    Hyperglycemia - Primary R73.9     -Her hemoglobin A1c this time was 5.7 and she will work on diet and exercise with weight loss         Relevant Orders    Basic Metabolic Panel    Hemoglobin A1C    Mild asthma ([facility]) J45.909     -Doing well at this time and we will continue to monitor         Relevant Medications    montelukast (Singulair) 10 mg tablet    Mild hypertension I10     -Her blood pressure overall appears to be well-controlled and we will continue with her current antihypertensive regimen         Relevant Medications    lisinopril 20 mg tablet    metoprolol succinate XL (Toprol-XL) 50 mg 24 hr tablet    Mixed hyperlipidemia E78.2     -She has had a phenomenal response to her atorvastatin and we will check again in 1 year-we also reviewed her coronary calcium scan which came back with a composite score of 0.  She understands that this is a good prognostic sign but not 100%           Relevant Medications    atorvastatin (Lipitor) 10 mg tablet          Neena Torres,

## 2024-06-17 NOTE — ASSESSMENT & PLAN NOTE
-Her blood pressure overall appears to be well-controlled and we will continue with her current antihypertensive regimen

## 2024-06-17 NOTE — ASSESSMENT & PLAN NOTE
-She has had a phenomenal response to her atorvastatin and we will check again in 1 year-we also reviewed her coronary calcium scan which came back with a composite score of 0.  She understands that this is a good prognostic sign but not 100%

## 2024-06-17 NOTE — PATIENT INSTRUCTIONS
As we discussed I am pleased with your checkup today and please continue with your exercise program.  Please do not hesitate to reach out if you have any questions or concerns and we will see you back in 6 months for another checkup.  I have ordered lab work so please remember to go for fasting lab work

## 2024-07-02 PROCEDURE — RXMED WILLOW AMBULATORY MEDICATION CHARGE

## 2024-07-05 ENCOUNTER — PHARMACY VISIT (OUTPATIENT)
Dept: PHARMACY | Facility: CLINIC | Age: 54
End: 2024-07-05
Payer: COMMERCIAL

## 2024-07-05 PROCEDURE — RXOTC WILLOW AMBULATORY OTC CHARGE

## 2024-07-15 ENCOUNTER — PHARMACY VISIT (OUTPATIENT)
Dept: PHARMACY | Facility: CLINIC | Age: 54
End: 2024-07-15
Payer: COMMERCIAL

## 2024-07-15 PROCEDURE — RXOTC WILLOW AMBULATORY OTC CHARGE

## 2024-07-15 PROCEDURE — RXMED WILLOW AMBULATORY MEDICATION CHARGE

## 2024-08-02 ENCOUNTER — PHARMACY VISIT (OUTPATIENT)
Dept: PHARMACY | Facility: CLINIC | Age: 54
End: 2024-08-02
Payer: COMMERCIAL

## 2024-08-02 PROCEDURE — RXOTC WILLOW AMBULATORY OTC CHARGE

## 2024-08-07 PROCEDURE — RXMED WILLOW AMBULATORY MEDICATION CHARGE

## 2024-08-08 ENCOUNTER — PHARMACY VISIT (OUTPATIENT)
Dept: PHARMACY | Facility: CLINIC | Age: 54
End: 2024-08-08
Payer: COMMERCIAL

## 2024-08-14 ENCOUNTER — PHARMACY VISIT (OUTPATIENT)
Dept: PHARMACY | Facility: CLINIC | Age: 54
End: 2024-08-14
Payer: COMMERCIAL

## 2024-08-14 PROCEDURE — RXMED WILLOW AMBULATORY MEDICATION CHARGE

## 2024-08-20 PROCEDURE — RXMED WILLOW AMBULATORY MEDICATION CHARGE

## 2024-08-27 ENCOUNTER — PHARMACY VISIT (OUTPATIENT)
Dept: PHARMACY | Facility: CLINIC | Age: 54
End: 2024-08-27
Payer: COMMERCIAL

## 2024-09-10 PROCEDURE — RXMED WILLOW AMBULATORY MEDICATION CHARGE

## 2024-09-11 ENCOUNTER — PHARMACY VISIT (OUTPATIENT)
Dept: PHARMACY | Facility: CLINIC | Age: 54
End: 2024-09-11
Payer: COMMERCIAL

## 2024-10-01 PROCEDURE — RXMED WILLOW AMBULATORY MEDICATION CHARGE

## 2024-10-02 ENCOUNTER — PHARMACY VISIT (OUTPATIENT)
Dept: PHARMACY | Facility: CLINIC | Age: 54
End: 2024-10-02
Payer: COMMERCIAL

## 2024-10-10 PROCEDURE — RXMED WILLOW AMBULATORY MEDICATION CHARGE

## 2024-10-14 ENCOUNTER — PHARMACY VISIT (OUTPATIENT)
Dept: PHARMACY | Facility: CLINIC | Age: 54
End: 2024-10-14
Payer: COMMERCIAL

## 2024-10-21 ENCOUNTER — PHARMACY VISIT (OUTPATIENT)
Dept: PHARMACY | Facility: CLINIC | Age: 54
End: 2024-10-21
Payer: COMMERCIAL

## 2024-10-21 PROCEDURE — RXOTC WILLOW AMBULATORY OTC CHARGE

## 2024-11-05 ENCOUNTER — PHARMACY VISIT (OUTPATIENT)
Dept: PHARMACY | Facility: CLINIC | Age: 54
End: 2024-11-05
Payer: COMMERCIAL

## 2024-11-05 PROCEDURE — RXMED WILLOW AMBULATORY MEDICATION CHARGE

## 2024-11-13 PROCEDURE — RXMED WILLOW AMBULATORY MEDICATION CHARGE

## 2024-11-14 ENCOUNTER — PHARMACY VISIT (OUTPATIENT)
Dept: PHARMACY | Facility: CLINIC | Age: 54
End: 2024-11-14
Payer: COMMERCIAL

## 2024-11-21 ENCOUNTER — PREP FOR PROCEDURE (OUTPATIENT)
Dept: PRIMARY CARE | Facility: CLINIC | Age: 54
End: 2024-11-21
Payer: COMMERCIAL

## 2024-11-24 PROCEDURE — RXMED WILLOW AMBULATORY MEDICATION CHARGE

## 2024-11-25 ENCOUNTER — PHARMACY VISIT (OUTPATIENT)
Dept: PHARMACY | Facility: CLINIC | Age: 54
End: 2024-11-25
Payer: COMMERCIAL

## 2024-11-25 PROCEDURE — RXOTC WILLOW AMBULATORY OTC CHARGE

## 2024-12-11 ENCOUNTER — PHARMACY VISIT (OUTPATIENT)
Dept: PHARMACY | Facility: CLINIC | Age: 54
End: 2024-12-11
Payer: COMMERCIAL

## 2024-12-11 PROCEDURE — RXMED WILLOW AMBULATORY MEDICATION CHARGE

## 2024-12-12 ENCOUNTER — LAB (OUTPATIENT)
Dept: LAB | Facility: LAB | Age: 54
End: 2024-12-12
Payer: COMMERCIAL

## 2024-12-12 DIAGNOSIS — R73.9 HYPERGLYCEMIA: ICD-10-CM

## 2024-12-12 LAB
ANION GAP SERPL CALC-SCNC: 13 MMOL/L (ref 10–20)
BUN SERPL-MCNC: 13 MG/DL (ref 6–23)
CALCIUM SERPL-MCNC: 9.4 MG/DL (ref 8.6–10.3)
CHLORIDE SERPL-SCNC: 107 MMOL/L (ref 98–107)
CO2 SERPL-SCNC: 26 MMOL/L (ref 21–32)
CREAT SERPL-MCNC: 0.72 MG/DL (ref 0.5–1.05)
EGFRCR SERPLBLD CKD-EPI 2021: >90 ML/MIN/1.73M*2
EST. AVERAGE GLUCOSE BLD GHB EST-MCNC: 114 MG/DL
GLUCOSE SERPL-MCNC: 106 MG/DL (ref 74–99)
HBA1C MFR BLD: 5.6 %
POTASSIUM SERPL-SCNC: 4.1 MMOL/L (ref 3.5–5.3)
SODIUM SERPL-SCNC: 142 MMOL/L (ref 136–145)

## 2024-12-12 PROCEDURE — 36415 COLL VENOUS BLD VENIPUNCTURE: CPT

## 2024-12-12 PROCEDURE — 80048 BASIC METABOLIC PNL TOTAL CA: CPT

## 2024-12-12 PROCEDURE — 83036 HEMOGLOBIN GLYCOSYLATED A1C: CPT

## 2024-12-16 ENCOUNTER — APPOINTMENT (OUTPATIENT)
Dept: PRIMARY CARE | Facility: CLINIC | Age: 54
End: 2024-12-16
Payer: COMMERCIAL

## 2024-12-16 VITALS
OXYGEN SATURATION: 95 % | BODY MASS INDEX: 36.65 KG/M2 | SYSTOLIC BLOOD PRESSURE: 138 MMHG | DIASTOLIC BLOOD PRESSURE: 88 MMHG | WEIGHT: 220 LBS | HEIGHT: 65 IN | HEART RATE: 76 BPM

## 2024-12-16 DIAGNOSIS — K21.9 GASTROESOPHAGEAL REFLUX DISEASE WITHOUT ESOPHAGITIS: ICD-10-CM

## 2024-12-16 DIAGNOSIS — R73.9 HYPERGLYCEMIA: Primary | ICD-10-CM

## 2024-12-16 DIAGNOSIS — Z12.31 ENCOUNTER FOR SCREENING MAMMOGRAM FOR MALIGNANT NEOPLASM OF BREAST: ICD-10-CM

## 2024-12-16 DIAGNOSIS — I10 MILD HYPERTENSION: ICD-10-CM

## 2024-12-16 DIAGNOSIS — E78.2 MIXED HYPERLIPIDEMIA: ICD-10-CM

## 2024-12-16 DIAGNOSIS — J45.909 MILD ASTHMA, UNSPECIFIED WHETHER COMPLICATED, UNSPECIFIED WHETHER PERSISTENT (HHS-HCC): ICD-10-CM

## 2024-12-16 DIAGNOSIS — F43.0 STRESS RESPONSE: ICD-10-CM

## 2024-12-16 PROCEDURE — 1036F TOBACCO NON-USER: CPT | Performed by: INTERNAL MEDICINE

## 2024-12-16 PROCEDURE — 3079F DIAST BP 80-89 MM HG: CPT | Performed by: INTERNAL MEDICINE

## 2024-12-16 PROCEDURE — 99214 OFFICE O/P EST MOD 30 MIN: CPT | Performed by: INTERNAL MEDICINE

## 2024-12-16 PROCEDURE — 3008F BODY MASS INDEX DOCD: CPT | Performed by: INTERNAL MEDICINE

## 2024-12-16 PROCEDURE — RXMED WILLOW AMBULATORY MEDICATION CHARGE

## 2024-12-16 PROCEDURE — 3075F SYST BP GE 130 - 139MM HG: CPT | Performed by: INTERNAL MEDICINE

## 2024-12-16 RX ORDER — LISINOPRIL 20 MG/1
20 TABLET ORAL DAILY
Qty: 100 TABLET | Refills: 1 | Status: SHIPPED | OUTPATIENT
Start: 2024-12-16 | End: 2025-12-16

## 2024-12-16 RX ORDER — FAMOTIDINE 20 MG/1
20 TABLET, FILM COATED ORAL 2 TIMES DAILY
Qty: 200 TABLET | Refills: 1 | Status: SHIPPED | OUTPATIENT
Start: 2024-12-16

## 2024-12-16 RX ORDER — MONTELUKAST SODIUM 10 MG/1
10 TABLET ORAL EVERY EVENING
Qty: 100 TABLET | Refills: 1 | Status: SHIPPED | OUTPATIENT
Start: 2024-12-16 | End: 2025-12-16

## 2024-12-16 RX ORDER — FLUTICASONE PROPIONATE 50 UG/1
POWDER, METERED RESPIRATORY (INHALATION)
Qty: 180 EACH | Refills: 1 | Status: SHIPPED | OUTPATIENT
Start: 2024-12-16 | End: 2025-12-16

## 2024-12-16 RX ORDER — VENLAFAXINE HYDROCHLORIDE 75 MG/1
CAPSULE, EXTENDED RELEASE ORAL
Qty: 100 CAPSULE | Refills: 1 | Status: SHIPPED | OUTPATIENT
Start: 2024-12-16 | End: 2025-12-16

## 2024-12-16 RX ORDER — ATORVASTATIN CALCIUM 10 MG/1
10 TABLET, FILM COATED ORAL DAILY
Qty: 100 TABLET | Refills: 1 | Status: SHIPPED | OUTPATIENT
Start: 2024-12-16 | End: 2025-06-14

## 2024-12-16 RX ORDER — METOPROLOL SUCCINATE 50 MG/1
TABLET, EXTENDED RELEASE ORAL
Qty: 300 TABLET | Refills: 1 | Status: SHIPPED | OUTPATIENT
Start: 2024-12-16

## 2024-12-16 ASSESSMENT — ENCOUNTER SYMPTOMS
ARTHRALGIAS: 0
BLOOD IN STOOL: 0
DIARRHEA: 0
BACK PAIN: 0
WHEEZING: 0
FATIGUE: 0
PALPITATIONS: 0
NAUSEA: 0
VOMITING: 0
SHORTNESS OF BREATH: 0
COUGH: 0
ABDOMINAL PAIN: 0

## 2024-12-16 NOTE — PATIENT INSTRUCTIONS
Patient instructions  As we discussed I am pleased with your lab test results today and please keep up the great work with your exercise routine.  Please remember to check your blood pressure periodically and please give me an update after several weeks  We sent refills for all your medicines and please let us know if there are any issues with the pharmacy  The staff will be helping you to get your mammogram scheduled for the spring  We will see you back in 6 months and please remember to get fasting lab work done prior to that visit

## 2024-12-16 NOTE — ASSESSMENT & PLAN NOTE
-She will continue her current medical regimen and she will get a fasting lipid profile just prior to her next follow-up visit

## 2024-12-16 NOTE — ASSESSMENT & PLAN NOTE
-Her hemoglobin A1c was only 5.6 this time   -she will keep up the wonderful-exercise routine  -She will watch her diet closely

## 2024-12-16 NOTE — ASSESSMENT & PLAN NOTE
-Her blood pressure is currently adequately controlled although was a little bit higher than desirable today  -She will continue to monitor and give us an update

## 2024-12-16 NOTE — PROGRESS NOTES
"Subjective   Patient ID: Tamela Dow is a 54 y.o. female who presents for Follow-up.  HPI  She is here today for her general 6-month checkup.  When she arrived her blood pressure was a bit generous but she was rushing to get to her appointment.  She states she checks her blood pressures regularly by doing a \"spot check \"and she typically gets systolics in the 130s and diastolics in the high 70s.  I did recheck her after she sat for a while and it did come down a few points.  We will continue to monitor.  She reports feeling well and we did conduct a full review of systems.  We also went over the results of lab work.  Her hemoglobin A1c has improved to 5.6.  She has done a remarkable job in limiting sweets especially around the holiday season.  She is also walking and exercising regularly.  I encouraged her to keep up the great work.  Her kidney function was normal.  We are providing refills on medicines today.  If everything goes according to plan we will see her back in 6 months for another checkup and she will get fasting lab work done prior to that visit  Review of Systems   Constitutional:  Negative for fatigue.   Respiratory:  Negative for cough, shortness of breath and wheezing.    Cardiovascular:  Negative for chest pain, palpitations and leg swelling.   Gastrointestinal:  Negative for abdominal pain, blood in stool, diarrhea, nausea and vomiting.   Musculoskeletal:  Negative for arthralgias and back pain.     Objective   Physical Exam  Vitals and nursing note reviewed.   Constitutional:       General: She is not in acute distress.     Appearance: Normal appearance.   HENT:      Head: Normocephalic and atraumatic.   Eyes:      Conjunctiva/sclera: Conjunctivae normal.   Cardiovascular:      Rate and Rhythm: Normal rate and regular rhythm.      Heart sounds: Normal heart sounds.   Pulmonary:      Effort: No respiratory distress.      Breath sounds: No wheezing.   Abdominal:      Palpations: Abdomen is soft.     "  Tenderness: There is no abdominal tenderness. There is no guarding.   Musculoskeletal:         General: No swelling. Normal range of motion.   Skin:     General: Skin is warm and dry.   Neurological:      General: No focal deficit present.      Mental Status: She is alert and oriented to person, place, and time.   Psychiatric:         Behavior: Behavior normal.       Recent Results (from the past 4 weeks)   Basic Metabolic Panel    Collection Time: 12/12/24  8:41 AM   Result Value Ref Range    Glucose 106 (H) 74 - 99 mg/dL    Sodium 142 136 - 145 mmol/L    Potassium 4.1 3.5 - 5.3 mmol/L    Chloride 107 98 - 107 mmol/L    Bicarbonate 26 21 - 32 mmol/L    Anion Gap 13 10 - 20 mmol/L    Urea Nitrogen 13 6 - 23 mg/dL    Creatinine 0.72 0.50 - 1.05 mg/dL    eGFR >90 >60 mL/min/1.73m*2    Calcium 9.4 8.6 - 10.3 mg/dL   Hemoglobin A1C    Collection Time: 12/12/24  8:41 AM   Result Value Ref Range    Hemoglobin A1C 5.6 See comment %    Estimated Average Glucose 114 Not Established mg/dL       Assessment/Plan   Problem List Items Addressed This Visit             ICD-10-CM    Stress response F43.0     -Doing well at this time and she will continue with her current medical regimen         Relevant Medications    venlafaxine XR (Effexor-XR) 75 mg 24 hr capsule    GERD (gastroesophageal reflux disease) K21.9     -Acid reflux is currently adequately controlled and we are providing a refill on her famotidine         Relevant Medications    famotidine (Pepcid) 20 mg tablet    Hyperglycemia - Primary R73.9     -Her hemoglobin A1c was only 5.6 this time   -she will keep up the wonderful-exercise routine  -She will watch her diet closely         Relevant Orders    Hemoglobin A1C    Mild asthma ([facility]) J45.909     -Her asthma is stable at this time and she will continue with her current medical regimen         Relevant Medications    montelukast (Singulair) 10 mg tablet    fluticasone (Flovent Diskus) 50 mcg/actuation diskus inhaler     Mild hypertension I10     -Her blood pressure is currently adequately controlled although was a little bit higher than desirable today  -She will continue to monitor and give us an update         Relevant Medications    lisinopril 20 mg tablet    metoprolol succinate XL (Toprol-XL) 50 mg 24 hr tablet    Other Relevant Orders    Basic Metabolic Panel    Mixed hyperlipidemia E78.2     -She will continue her current medical regimen and she will get a fasting lipid profile just prior to her next follow-up visit         Relevant Medications    atorvastatin (Lipitor) 10 mg tablet    Other Relevant Orders    Lipid Panel    Encounter for screening mammogram for malignant neoplasm of breast Z12.31    Relevant Orders    BI mammo bilateral screening tomosynthesis   Patient instructions  As we discussed I am pleased with your lab test results today and please keep up the great work with your exercise routine.  Please remember to check your blood pressure periodically and please give me an update after several weeks  We sent refills for all your medicines and please let us know if there are any issues with the pharmacy  The staff will be helping you to get your mammogram scheduled for the spring  We will see you back in 6 months and please remember to get fasting lab work done prior to that visit       Neena Torres, DO

## 2024-12-20 ENCOUNTER — PHARMACY VISIT (OUTPATIENT)
Dept: PHARMACY | Facility: CLINIC | Age: 54
End: 2024-12-20
Payer: COMMERCIAL

## 2024-12-20 PROCEDURE — RXOTC WILLOW AMBULATORY OTC CHARGE

## 2025-01-10 ENCOUNTER — PHARMACY VISIT (OUTPATIENT)
Dept: PHARMACY | Facility: CLINIC | Age: 55
End: 2025-01-10
Payer: COMMERCIAL

## 2025-01-10 PROCEDURE — RXMED WILLOW AMBULATORY MEDICATION CHARGE

## 2025-01-21 DIAGNOSIS — J45.20 MILD INTERMITTENT ASTHMA WITHOUT COMPLICATION (HHS-HCC): Primary | ICD-10-CM

## 2025-01-21 RX ORDER — FLUTICASONE FUROATE 100 UG/1
1 POWDER RESPIRATORY (INHALATION) DAILY
Qty: 1 EACH | Refills: 5 | Status: SHIPPED | OUTPATIENT
Start: 2025-01-21 | End: 2026-01-21

## 2025-02-03 ENCOUNTER — PHARMACY VISIT (OUTPATIENT)
Dept: PHARMACY | Facility: CLINIC | Age: 55
End: 2025-02-03
Payer: COMMERCIAL

## 2025-02-03 PROCEDURE — RXOTC WILLOW AMBULATORY OTC CHARGE

## 2025-02-04 PROCEDURE — RXMED WILLOW AMBULATORY MEDICATION CHARGE

## 2025-02-05 ENCOUNTER — PHARMACY VISIT (OUTPATIENT)
Dept: PHARMACY | Facility: CLINIC | Age: 55
End: 2025-02-05
Payer: COMMERCIAL

## 2025-02-05 PROCEDURE — RXOTC WILLOW AMBULATORY OTC CHARGE

## 2025-02-10 PROCEDURE — RXMED WILLOW AMBULATORY MEDICATION CHARGE

## 2025-02-13 ENCOUNTER — PHARMACY VISIT (OUTPATIENT)
Dept: PHARMACY | Facility: CLINIC | Age: 55
End: 2025-02-13
Payer: COMMERCIAL

## 2025-02-20 ENCOUNTER — PHARMACY VISIT (OUTPATIENT)
Dept: PHARMACY | Facility: CLINIC | Age: 55
End: 2025-02-20
Payer: COMMERCIAL

## 2025-02-20 PROCEDURE — RXMED WILLOW AMBULATORY MEDICATION CHARGE

## 2025-03-12 PROCEDURE — RXMED WILLOW AMBULATORY MEDICATION CHARGE

## 2025-03-13 ENCOUNTER — PHARMACY VISIT (OUTPATIENT)
Dept: PHARMACY | Facility: CLINIC | Age: 55
End: 2025-03-13
Payer: COMMERCIAL

## 2025-03-13 PROCEDURE — RXOTC WILLOW AMBULATORY OTC CHARGE

## 2025-03-17 ENCOUNTER — HOSPITAL ENCOUNTER (OUTPATIENT)
Dept: RADIOLOGY | Facility: HOSPITAL | Age: 55
Discharge: HOME | End: 2025-03-17
Payer: COMMERCIAL

## 2025-03-17 DIAGNOSIS — Z12.31 ENCOUNTER FOR SCREENING MAMMOGRAM FOR MALIGNANT NEOPLASM OF BREAST: ICD-10-CM

## 2025-03-17 PROCEDURE — 77063 BREAST TOMOSYNTHESIS BI: CPT

## 2025-03-17 PROCEDURE — 77067 SCR MAMMO BI INCL CAD: CPT | Performed by: RADIOLOGY

## 2025-03-17 PROCEDURE — 77063 BREAST TOMOSYNTHESIS BI: CPT | Performed by: RADIOLOGY

## 2025-03-18 DIAGNOSIS — R92.8 ABNORMAL MAMMOGRAM OF LEFT BREAST: Primary | ICD-10-CM

## 2025-03-18 NOTE — RESULT ENCOUNTER NOTE
"I called her about the abnormal mammogram.  I am ordering the ultrasound of the left breast as recommended by radiology.  She expresses understanding.  We discussed how she is constantly having follow-up diagnostic mammograms and I suggested she might benefit from doing a \"fast MRI \"."

## 2025-03-21 ENCOUNTER — HOSPITAL ENCOUNTER (OUTPATIENT)
Dept: RADIOLOGY | Facility: HOSPITAL | Age: 55
Discharge: HOME | End: 2025-03-21
Payer: COMMERCIAL

## 2025-03-21 DIAGNOSIS — R92.8 ABNORMAL MAMMOGRAM OF LEFT BREAST: ICD-10-CM

## 2025-03-21 PROCEDURE — 76642 ULTRASOUND BREAST LIMITED: CPT | Mod: LT

## 2025-04-07 PROCEDURE — RXMED WILLOW AMBULATORY MEDICATION CHARGE

## 2025-04-08 ENCOUNTER — PHARMACY VISIT (OUTPATIENT)
Dept: PHARMACY | Facility: CLINIC | Age: 55
End: 2025-04-08
Payer: COMMERCIAL

## 2025-04-21 PROCEDURE — RXMED WILLOW AMBULATORY MEDICATION CHARGE

## 2025-04-22 ENCOUNTER — PHARMACY VISIT (OUTPATIENT)
Dept: PHARMACY | Facility: CLINIC | Age: 55
End: 2025-04-22
Payer: COMMERCIAL

## 2025-04-23 ENCOUNTER — PHARMACY VISIT (OUTPATIENT)
Dept: PHARMACY | Facility: CLINIC | Age: 55
End: 2025-04-23
Payer: COMMERCIAL

## 2025-04-23 PROCEDURE — RXOTC WILLOW AMBULATORY OTC CHARGE

## 2025-05-01 ENCOUNTER — APPOINTMENT (OUTPATIENT)
Dept: CARDIOLOGY | Facility: CLINIC | Age: 55
End: 2025-05-01
Payer: COMMERCIAL

## 2025-05-01 ENCOUNTER — OFFICE VISIT (OUTPATIENT)
Dept: CARDIOLOGY | Facility: CLINIC | Age: 55
End: 2025-05-01
Payer: COMMERCIAL

## 2025-05-01 VITALS
OXYGEN SATURATION: 93 % | DIASTOLIC BLOOD PRESSURE: 102 MMHG | HEIGHT: 65 IN | HEART RATE: 69 BPM | WEIGHT: 219.2 LBS | SYSTOLIC BLOOD PRESSURE: 160 MMHG | BODY MASS INDEX: 36.52 KG/M2

## 2025-05-01 DIAGNOSIS — I49.3 PVC (PREMATURE VENTRICULAR CONTRACTION): Primary | ICD-10-CM

## 2025-05-01 DIAGNOSIS — I11.9 BENIGN HYPERTENSIVE HEART DISEASE WITHOUT CONGESTIVE HEART FAILURE: ICD-10-CM

## 2025-05-01 PROCEDURE — 3077F SYST BP >= 140 MM HG: CPT | Performed by: STUDENT IN AN ORGANIZED HEALTH CARE EDUCATION/TRAINING PROGRAM

## 2025-05-01 PROCEDURE — 99204 OFFICE O/P NEW MOD 45 MIN: CPT | Performed by: STUDENT IN AN ORGANIZED HEALTH CARE EDUCATION/TRAINING PROGRAM

## 2025-05-01 PROCEDURE — 1036F TOBACCO NON-USER: CPT | Performed by: STUDENT IN AN ORGANIZED HEALTH CARE EDUCATION/TRAINING PROGRAM

## 2025-05-01 PROCEDURE — 3008F BODY MASS INDEX DOCD: CPT | Performed by: STUDENT IN AN ORGANIZED HEALTH CARE EDUCATION/TRAINING PROGRAM

## 2025-05-01 PROCEDURE — 3080F DIAST BP >= 90 MM HG: CPT | Performed by: STUDENT IN AN ORGANIZED HEALTH CARE EDUCATION/TRAINING PROGRAM

## 2025-05-01 NOTE — PROGRESS NOTES
No chief complaint on file.    HPI:  I was requested by Dr. Torres to evaluate this patient in consultation for cardiac assessment.    Mrs. Tamela Dow is a very pleasant 55 y.o. year old non-smoker female with past medical history significant for asthma, PVCs, family history of premature CAD. Coming for establishment of care. She is currently asymptomatic and feeling well on medication strategy for the PVCs.  She denies chest pain, shortness of breath, palpitations, leg edema, lightheadedness, headaches, fever, chills, orthopnea, paroxysmal nocturnal dyspnea or syncope. She has also a strong past family history for early CAD, with sudden death at age ~40 in many members of her family.     Past Medical History  Medical History[1]    Past Surgical History  Surgical History[2]    Past Family History  Family History[3]    Allergy History  Allergies[4]    Past Social History  Social History[5]    Tobacco Use History[6]    Review of Systems:  A total of 12 systems have been reviewed and are negative except for the aforementioned findings described in HPI.     Objective Data:  Last Recorded Vitals:  There were no vitals filed for this visit.    Last Labs:  CBC - No results in last year.  _ _ _    _      CMP - 12/12/2024:  8:41 AM  9.4 _ _ --- _   _ _ _ _      PTT - No results in last year.  _   _ _     HGBA1C   Date/Time Value Ref Range Status   12/12/2024 08:41 AM 5.6 See comment % Final   06/14/2024 08:25 AM 5.7 see below % Final     LDLCALC   Date/Time Value Ref Range Status   06/14/2024 08:25 AM 69 <=99 mg/dL Final     Comment:                                 Near   Borderline      AGE      Desirable  Optimal    High     High     Very High     0-19 Y     0 - 109     ---    110-129   >/= 130     ----    20-24 Y     0 - 119     ---    120-159   >/= 160     ----      >24 Y     0 -  99   100-129  130-159   160-189     >/=190     12/15/2023 09:07  <=99 mg/dL Final     Comment:                                 Near    Borderline      AGE      Desirable  Optimal    High     High     Very High     0-19 Y     0 - 109     ---    110-129   >/= 130     ----    20-24 Y     0 - 119     ---    120-159   >/= 160     ----      >24 Y     0 -  99   100-129  130-159   160-189     >/=190       VLDL   Date/Time Value Ref Range Status   06/14/2024 08:25 AM 36 0 - 40 mg/dL Final   12/15/2023 09:07 AM 46 0 - 40 mg/dL Final   05/02/2023 09:00 AM 36 0 - 40 mg/dL Final   02/03/2021 09:46 AM 25 0 - 40 mg/dL Final        Patient Medications:  Encounter Medications[7]    Physical Exam:  General: alert, oriented and in no acute distress  HEENT: NC/AT; EOMI; PERRLA, external ear is normal  Neck: supple; trachea midline; no masses; no JVD  Chest: clear breath sounds bilaterally; no wheezing  Cardio: regular rhythm, S1S2 normal, no murmurs  Abdomen: Soft, non-tender, non-distension, no organomegaly  Extremities: no clubbing/cyanosis/edema  Neuro: Grossly intact     Psychiatric: Normal mood and affect     Past Cardiology Results (Last 3 Years):  EKG:  ECG 12 lead (Clinic Performed) 05/02/2024    Echo:  Echo Results:  No results found for this or any previous visit from the past 365 days.     Cath:  No results found for this or any previous visit from the past 1095 days.    CV NCDR CATHPCI V5 COLLECTION FORM   Stress Test:  No results found for this or any previous visit from the past 1095 days.    Cardiac Imaging:  No results found for this or any previous visit from the past 1095 days.       Assessment/Plan     Mrs. Tamela Dow is a very pleasant 55 y.o. year old non-smoker female with past medical history significant for asthma, PVCs, family history of premature CAD. Coming for establishment of care. She is currently asymptomatic and feeling well on medication strategy for the PVCs.  She denies chest pain, shortness of breath, palpitations, leg edema, lightheadedness, headaches, fever, chills, orthopnea, paroxysmal nocturnal dyspnea or syncope. She has also  a strong past family history for early CAD, with sudden death at age ~40 in many members of her family.    Assessment    # PVCs (Premature Ventricular Contractions)  - Prior monitor (1/7/2021) showing 3% incidence of PVCs  - PVCs seem to correlate with symptoms.  - Echocardiogram done in 2/2021 is negative for any structural heart defects.  - A nuclear stress test (pharmacological) was negative for any perfusion defects.  - Following initiation of metoprolol 75 mg succinate patient symptoms have completely resolved. She is currently asymptomatic.  - EKG shows normal sinus rhythm with no signs of acute ischemic changes.   - Follow up yearly.     # History of premature coronary artery disease  - She has also a strong past family history for early CAD, with sudden death at age ~40 in many members of her family.  - Underwent CT cardiac calcium scoring in 5/2019 which was 0 (twice).     We have discussed the most common side effects of the prescribed medications, indications, drug interactions, risks, complications, and alternatives of medications/therapeutics were explained and discussed. The patient has been requested to monitor closely for any untoward side effects or complications of medications. The patient has been strongly advised to be compliant with the recommendations, all the questions and concerns have been addressed. The patient has been also instructed to call, to return sooner or to go to the emergency department if symptoms persist or get worsen. The patient voiced understanding and denies any further questions at this time.     This note was transcribed using the Dragon Dictation system. There may be grammatical, punctuation, or verbiage errors that occur with voice recognition programs.    Counseling greater than 50% of visit regarding all cardiac issues.    Thank you, Dr. Torres, for allowing me to participate in the care of this patient. Please do not hesitate to contact me with any further questions  or concerns.    Aneudy Cruz MD  Cardiology       [1]   Past Medical History:  Diagnosis Date    Abnormal Pap smear of cervix     Asymptomatic menopausal state     History of menopause    Breast cyst years ago    Disease of thyroid gland     Other conditions influencing health status     History of pregnancy    Other conditions influencing health status     Menarche    Other specified postprocedural states     History of Papanicolaou smear    Personal history of other diseases of the musculoskeletal system and connective tissue     History of scoliosis    Personal history of other medical treatment     History of mammogram    Thyroid nodule 06/15/2023   [2]   Past Surgical History:  Procedure Laterality Date    CERVICAL BIOPSY  W/ LOOP ELECTRODE EXCISION      COLONOSCOPY      HYSTERECTOMY  05/2003    OTHER SURGICAL HISTORY  11/20/2019    Hydatidiform mole removal   [3]   Family History  Problem Relation Name Age of Onset    Hyperlipidemia Mother Mom     Hypertension Mother Mom     Depression Mother Mom     Hypertension Father      Heart attack Father     [4]   Allergies  Allergen Reactions    Adhesive Tape-Silicones Unknown    Penicillins Unknown    Sulfa (Sulfonamide Antibiotics) Hives   [5]   Social History  Socioeconomic History    Marital status:    Tobacco Use    Smoking status: Never    Smokeless tobacco: Never   Substance and Sexual Activity    Alcohol use: Never    Drug use: Never    Sexual activity: Yes     Partners: Male     Birth control/protection: Female Sterilization   [6]   Social History  Tobacco Use   Smoking Status Never   Smokeless Tobacco Never   [7]   Outpatient Encounter Medications as of 5/1/2025   Medication Sig Dispense Refill    albuterol 90 mcg/actuation inhaler Inhale 1 puff every 4 hours if needed for wheezing or shortness of breath.      atorvastatin (Lipitor) 10 mg tablet Take 1 tablet (10 mg) by mouth once daily. 100 tablet 1    famotidine (Pepcid) 20 mg tablet  Take 1 tablet (20 mg) by mouth 2 times a day. 200 tablet 1    fluticasone furoate (Arnuity Ellipta) 100 mcg/actuation inhaler Inhale 1 puff once daily. Rinse mouth with water after use to reduce aftertaste and incidence of candidiasis. Do not swallow. 1 each 5    lisinopril 20 mg tablet TAKE 1 TABLET BY MOUTH EVERY  tablet 1    metoprolol succinate XL (Toprol-XL) 50 mg 24 hr tablet Take 1-1/2 tablet twice daily 300 tablet 1    montelukast (Singulair) 10 mg tablet Take 1 tablet (10 mg) by mouth once daily in the evening. 90 tablet 1    multivit-iron-minerals-folic acid (Adults 50 Plus) 0.4 mg-300 mcg- 250 mcg tab Take 1 tablet by mouth once daily.      venlafaxine XR (Effexor-XR) 75 mg 24 hr capsule TAKE 1 CAPSULE (75 MG) BY MOUTH ONCE DAILY. 90 capsule 1     No facility-administered encounter medications on file as of 5/1/2025.

## 2025-05-14 PROCEDURE — RXMED WILLOW AMBULATORY MEDICATION CHARGE

## 2025-05-15 ENCOUNTER — PHARMACY VISIT (OUTPATIENT)
Dept: PHARMACY | Facility: CLINIC | Age: 55
End: 2025-05-15
Payer: COMMERCIAL

## 2025-05-22 PROCEDURE — RXMED WILLOW AMBULATORY MEDICATION CHARGE

## 2025-05-23 ENCOUNTER — PHARMACY VISIT (OUTPATIENT)
Dept: PHARMACY | Facility: CLINIC | Age: 55
End: 2025-05-23
Payer: COMMERCIAL

## 2025-05-30 ENCOUNTER — PHARMACY VISIT (OUTPATIENT)
Dept: PHARMACY | Facility: CLINIC | Age: 55
End: 2025-05-30
Payer: COMMERCIAL

## 2025-05-30 PROCEDURE — RXOTC WILLOW AMBULATORY OTC CHARGE

## 2025-06-05 ENCOUNTER — PHARMACY VISIT (OUTPATIENT)
Dept: PHARMACY | Facility: CLINIC | Age: 55
End: 2025-06-05
Payer: COMMERCIAL

## 2025-06-05 PROCEDURE — RXMED WILLOW AMBULATORY MEDICATION CHARGE

## 2025-06-16 ENCOUNTER — APPOINTMENT (OUTPATIENT)
Age: 55
End: 2025-06-16
Payer: COMMERCIAL

## 2025-06-16 VITALS
BODY MASS INDEX: 36.49 KG/M2 | OXYGEN SATURATION: 99 % | SYSTOLIC BLOOD PRESSURE: 156 MMHG | HEART RATE: 71 BPM | WEIGHT: 219 LBS | DIASTOLIC BLOOD PRESSURE: 98 MMHG | HEIGHT: 65 IN

## 2025-06-16 DIAGNOSIS — J45.909 MILD ASTHMA, UNSPECIFIED WHETHER COMPLICATED, UNSPECIFIED WHETHER PERSISTENT (HHS-HCC): ICD-10-CM

## 2025-06-16 DIAGNOSIS — I10 MILD HYPERTENSION: ICD-10-CM

## 2025-06-16 DIAGNOSIS — R73.9 HYPERGLYCEMIA: ICD-10-CM

## 2025-06-16 DIAGNOSIS — F43.0 STRESS RESPONSE: ICD-10-CM

## 2025-06-16 DIAGNOSIS — R35.1 NOCTURIA: Primary | ICD-10-CM

## 2025-06-16 DIAGNOSIS — K21.9 GASTROESOPHAGEAL REFLUX DISEASE WITHOUT ESOPHAGITIS: ICD-10-CM

## 2025-06-16 DIAGNOSIS — E78.2 MIXED HYPERLIPIDEMIA: ICD-10-CM

## 2025-06-16 PROCEDURE — RXMED WILLOW AMBULATORY MEDICATION CHARGE

## 2025-06-16 PROCEDURE — 3080F DIAST BP >= 90 MM HG: CPT | Performed by: INTERNAL MEDICINE

## 2025-06-16 PROCEDURE — 1036F TOBACCO NON-USER: CPT | Performed by: INTERNAL MEDICINE

## 2025-06-16 PROCEDURE — 3077F SYST BP >= 140 MM HG: CPT | Performed by: INTERNAL MEDICINE

## 2025-06-16 PROCEDURE — 3008F BODY MASS INDEX DOCD: CPT | Performed by: INTERNAL MEDICINE

## 2025-06-16 PROCEDURE — 99214 OFFICE O/P EST MOD 30 MIN: CPT | Performed by: INTERNAL MEDICINE

## 2025-06-16 RX ORDER — ATORVASTATIN CALCIUM 10 MG/1
10 TABLET, FILM COATED ORAL DAILY
Qty: 100 TABLET | Refills: 1 | Status: SHIPPED | OUTPATIENT
Start: 2025-06-16 | End: 2025-12-13

## 2025-06-16 RX ORDER — MONTELUKAST SODIUM 10 MG/1
10 TABLET ORAL EVERY EVENING
Qty: 100 TABLET | Refills: 1 | Status: SHIPPED | OUTPATIENT
Start: 2025-06-16 | End: 2026-06-16

## 2025-06-16 RX ORDER — FAMOTIDINE 20 MG/1
20 TABLET, FILM COATED ORAL 2 TIMES DAILY
Qty: 200 TABLET | Refills: 1 | Status: SHIPPED | OUTPATIENT
Start: 2025-06-16

## 2025-06-16 RX ORDER — VENLAFAXINE HYDROCHLORIDE 75 MG/1
CAPSULE, EXTENDED RELEASE ORAL
Qty: 100 CAPSULE | Refills: 1 | Status: SHIPPED | OUTPATIENT
Start: 2025-06-16 | End: 2026-06-16

## 2025-06-16 RX ORDER — METOPROLOL SUCCINATE 50 MG/1
TABLET, EXTENDED RELEASE ORAL
Qty: 300 TABLET | Refills: 1 | Status: SHIPPED | OUTPATIENT
Start: 2025-06-16

## 2025-06-16 RX ORDER — LISINOPRIL 40 MG/1
40 TABLET ORAL DAILY
Qty: 30 TABLET | Refills: 5 | Status: SHIPPED | OUTPATIENT
Start: 2025-06-16 | End: 2026-07-21

## 2025-06-16 ASSESSMENT — ENCOUNTER SYMPTOMS
ARTHRALGIAS: 0
BLOOD IN STOOL: 0
BACK PAIN: 0
NAUSEA: 0
FREQUENCY: 0
DYSURIA: 0
SHORTNESS OF BREATH: 0
VOMITING: 0
DIARRHEA: 0
PALPITATIONS: 0
COUGH: 0
FATIGUE: 0
ABDOMINAL PAIN: 0
WHEEZING: 0

## 2025-06-16 NOTE — ASSESSMENT & PLAN NOTE
- With her blood pressure she will now go to 40 mg of lisinopril and give me an update over the next couple of weeks  -She will also stay on her metoprolol current dose  -She also understands that watching her sodium is of great benefit and also weight loss can help significantly

## 2025-06-16 NOTE — PROGRESS NOTES
Subjective   Patient ID: Tamela Dow is a 55 y.o. female who presents for Follow-up (6 mo follow up).  HPI  She is here today for a routine checkup.  She has been checking her blood pressures regularly and unfortunately they have been higher than usual.  Recently she started taking 1-1/2 of her lisinopril 20 mg tablets which seems to have helped somewhat.  Today her blood pressure is high.  We decided to have her go to a 40 mg dose of lisinopril and she will give me an update over the next week or so.  She will also remain on her current dose of metoprolol.  She states that recently she has been getting up frequently to urinate in the night.  She has no urologic symptoms such as burning or other abnormalities.  She has been tested for sleep apnea in the past and clearly did not suffer from the condition.  We discussed first trying to eliminate water or liquid intake 4 hours prior to bed.  I have asked that she maintain good hydration but try to limit the fluids before bedtime.  She will let us know if this is not effective in controlling her symptoms.  She also states she is going to be signing up for yoga which is excellent.  We had intended for her to get lab work done prior to today's visit but she forgot and she will be going tomorrow.  I have agreed to contact her with the results.  I will summarize everything in a problem based format.  Review of Systems   Constitutional:  Negative for fatigue.   Respiratory:  Negative for cough, shortness of breath and wheezing.    Cardiovascular:  Negative for chest pain, palpitations and leg swelling.   Gastrointestinal:  Negative for abdominal pain, blood in stool, diarrhea, nausea and vomiting.   Genitourinary:  Negative for dysuria, frequency and urgency.   Musculoskeletal:  Negative for arthralgias and back pain.     Objective   Physical Exam  Vitals and nursing note reviewed.   Constitutional:       General: She is not in acute distress.     Appearance: Normal  appearance.   HENT:      Head: Normocephalic and atraumatic.   Eyes:      Conjunctiva/sclera: Conjunctivae normal.   Cardiovascular:      Rate and Rhythm: Normal rate and regular rhythm.      Heart sounds: Normal heart sounds.   Pulmonary:      Effort: No respiratory distress.      Breath sounds: No wheezing.   Abdominal:      Palpations: Abdomen is soft.      Tenderness: There is no abdominal tenderness. There is no guarding.   Musculoskeletal:         General: No swelling. Normal range of motion.   Skin:     General: Skin is warm and dry.   Neurological:      General: No focal deficit present.      Mental Status: She is alert and oriented to person, place, and time.   Psychiatric:         Behavior: Behavior normal.       Assessment/Plan   Problem List Items Addressed This Visit           ICD-10-CM    Stress response F43.0    Relevant Medications    venlafaxine XR (Effexor-XR) 75 mg 24 hr capsule    GERD (gastroesophageal reflux disease) K21.9    - Acid reflux appears to be under adequate control so we are providing a refill on her medicine today         Relevant Medications    famotidine (Pepcid) 20 mg tablet    Hyperglycemia R73.9    - She will get her hemoglobin A1c drawn tomorrow and I have agreed to contact her with results         Relevant Orders    Basic Metabolic Panel    Hemoglobin A1C    Mild asthma ([facility]) J45.909    - Stable at this time         Relevant Medications    montelukast (Singulair) 10 mg tablet    Mild hypertension I10    - With her blood pressure she will now go to 40 mg of lisinopril and give me an update over the next couple of weeks  -She will also stay on her metoprolol current dose  -She also understands that watching her sodium is of great benefit and also weight loss can help significantly         Relevant Medications    lisinopril 40 mg tablet    metoprolol succinate XL (Toprol-XL) 50 mg 24 hr tablet    Mixed hyperlipidemia E78.2    - She will be going for a fasting lipid profile  tomorrow and have agreed to contact her with the results once they are known         Relevant Medications    atorvastatin (Lipitor) 10 mg tablet    Nocturia - Primary R35.1    - She will limit fluids 4 hours prior to bedtime and give me an update on her nocturia        Patient instructions  As we discussed we have decided to increase your lisinopril dose to the full 40 mg dose and I sent a new prescription to your pharmacy for that dose.  Please give me an update in the next couple of weeks on your blood pressure  We will have you stay on your current dose of metoprolol  Please try to limit drinking fluids 4 hours prior to bed and let me know if your nocturia does not clear up  Please remember to go for fasting lab work in the morning and we will contact you with the results once they are known  Otherwise if everything goes according to plan we will see you back in 6 months for another checkup       Neena Torres, DO

## 2025-06-16 NOTE — PATIENT INSTRUCTIONS
Patient instructions  As we discussed we have decided to increase your lisinopril dose to the full 40 mg dose and I sent a new prescription to your pharmacy for that dose.  Please give me an update in the next couple of weeks on your blood pressure  We will have you stay on your current dose of metoprolol  Please try to limit drinking fluids 4 hours prior to bed and let me know if your nocturia does not clear up  Please remember to go for fasting lab work in the morning and we will contact you with the results once they are known  Otherwise if everything goes according to plan we will see you back in 6 months for another checkup

## 2025-06-16 NOTE — ASSESSMENT & PLAN NOTE
- Acid reflux appears to be under adequate control so we are providing a refill on her medicine today

## 2025-06-18 ENCOUNTER — PHARMACY VISIT (OUTPATIENT)
Dept: PHARMACY | Facility: CLINIC | Age: 55
End: 2025-06-18
Payer: COMMERCIAL

## 2025-06-19 LAB
ANION GAP SERPL CALCULATED.4IONS-SCNC: 13 MMOL/L (CALC) (ref 7–17)
BUN SERPL-MCNC: 14 MG/DL (ref 7–25)
BUN/CREAT SERPL: ABNORMAL (CALC) (ref 6–22)
CALCIUM SERPL-MCNC: 9.4 MG/DL (ref 8.6–10.4)
CHLORIDE SERPL-SCNC: 106 MMOL/L (ref 98–110)
CHOLEST SERPL-MCNC: 161 MG/DL
CHOLEST/HDLC SERPL: 3.2 (CALC)
CO2 SERPL-SCNC: 22 MMOL/L (ref 20–32)
CREAT SERPL-MCNC: 0.65 MG/DL (ref 0.5–1.03)
EGFRCR SERPLBLD CKD-EPI 2021: 105 ML/MIN/1.73M2
EST. AVERAGE GLUCOSE BLD GHB EST-MCNC: 120 MG/DL
EST. AVERAGE GLUCOSE BLD GHB EST-SCNC: 6.6 MMOL/L
GLUCOSE SERPL-MCNC: 104 MG/DL (ref 65–99)
HBA1C MFR BLD: 5.8 %
HDLC SERPL-MCNC: 51 MG/DL
LDLC SERPL CALC-MCNC: 83 MG/DL (CALC)
NONHDLC SERPL-MCNC: 110 MG/DL (CALC)
POTASSIUM SERPL-SCNC: 4.1 MMOL/L (ref 3.5–5.3)
SODIUM SERPL-SCNC: 141 MMOL/L (ref 135–146)
TRIGL SERPL-MCNC: 174 MG/DL

## 2025-06-19 NOTE — RESULT ENCOUNTER NOTE
I called her with the results of the lab work.  She will continue with a higher dose of lisinopril and give us an update on blood pressures in the next couple of weeks.  We also remind her to limit fluids several hours prior to bed to see if that helps with her nighttime urination.  She will give us an update

## 2025-06-24 ENCOUNTER — APPOINTMENT (OUTPATIENT)
Dept: SURGERY | Facility: CLINIC | Age: 55
End: 2025-06-24
Payer: COMMERCIAL

## 2025-06-24 VITALS
WEIGHT: 222.4 LBS | HEART RATE: 84 BPM | SYSTOLIC BLOOD PRESSURE: 170 MMHG | BODY MASS INDEX: 37.05 KG/M2 | HEIGHT: 65 IN | DIASTOLIC BLOOD PRESSURE: 100 MMHG

## 2025-06-24 DIAGNOSIS — L81.9 CHANGE IN MULTIPLE PIGMENTED SKIN LESIONS: Primary | ICD-10-CM

## 2025-06-24 PROCEDURE — 1036F TOBACCO NON-USER: CPT | Performed by: SURGERY

## 2025-06-24 PROCEDURE — 3077F SYST BP >= 140 MM HG: CPT | Performed by: SURGERY

## 2025-06-24 PROCEDURE — 3008F BODY MASS INDEX DOCD: CPT | Performed by: SURGERY

## 2025-06-24 PROCEDURE — 99203 OFFICE O/P NEW LOW 30 MIN: CPT | Performed by: SURGERY

## 2025-06-24 PROCEDURE — 3080F DIAST BP >= 90 MM HG: CPT | Performed by: SURGERY

## 2025-06-24 NOTE — PROGRESS NOTES
General Surgery Consultation    Patient: Tamela Dow  : 1970  MRN: [id number]  Date of Consultation: 25    Primary Care Provider: Neena Torres DO    Chief Complaint: Irregular changing mole on left breast    History of Present Illness: Tamela Dow is a 55 y.o. old female seen for evaluation of an irregular changing mole on her left breast.  This has been present for a period of time but is slowly getting larger.  It has been changing in color and now has different colors.  She has multiple moles on her body and he has not had a total body scan by dermatologist.    Medical History:  Medical History[1]    Surgical History:  Surgical History[2]    Home Medications:  Prior to Admission medications    Medication Sig Start Date End Date Taking? Authorizing Provider   albuterol 90 mcg/actuation inhaler Inhale 1 puff every 4 hours if needed for wheezing or shortness of breath.   Yes Historical Provider, MD   atorvastatin (Lipitor) 10 mg tablet Take 1 tablet (10 mg) by mouth once daily. 25 Yes Neena Torres DO   famotidine (Pepcid) 20 mg tablet Take 1 tablet (20 mg) by mouth 2 times a day. 25  Yes Neena Torres DO   fluticasone furoate (Arnuity Ellipta) 100 mcg/actuation inhaler Inhale 1 puff once daily. Rinse mouth with water after use to reduce aftertaste and incidence of candidiasis. Do not swallow. 25 Yes Neena Torres DO   lisinopril 40 mg tablet Take 1 tablet (40 mg) by mouth once daily. 25 Yes Neena Torres DO   metoprolol succinate XL (Toprol-XL) 50 mg 24 hr tablet Take 1-1/2 tablet twice daily 25  Yes Neena Torres DO   montelukast (Singulair) 10 mg tablet Take 1 tablet (10 mg) by mouth once daily in the evening. 25 Yes Neena Torres DO   multivit-iron-minerals-folic acid (Adults 50 Plus) 0.4 mg-300 mcg- 250 mcg tab Take 1 tablet by mouth once daily. 4/28/22  Yes Historical Provider, MD   venlafaxine XR  "(Effexor-XR) 75 mg 24 hr capsule TAKE 1 CAPSULE (75 MG) BY MOUTH ONCE DAILY. 6/16/25 6/16/26 Yes Neena Torres,        Allergies:  Allergies[3]  is allergic to adhesive tape-silicones, penicillins, and sulfa (sulfonamide antibiotics).    Family History:   Family History[4]    Social History:  Social History[5]    ROS:  Constitutional:  no fever, sweats, and chills  Cardiovascular: No chest pain  Respiratory: No cough or shortness of breath  Gastrointestinal: Denies  Genitourinary: no dysuria  Musculoskeletal: no weakness or swelling  Integumentary: no rashes  Neurological: no confusion  Endocrine: no heat or cold intolerance  Heme/Lymph: no easy bruising or bleeding    Objective:  BP (!) 170/100   Pulse 84   Ht 1.651 m (5' 5\")   Wt 101 kg (222 lb 6.4 oz)   BMI 37.01 kg/m²     Physical Exam:  Constitutional: No acute distress, conversant, pleasant  Neurologic: alert and oriented  Psych: appropriate affect  Ears, Nose, Mouth and Throat: mucus membranes moist  Pulmonary: No labored breathing  Cardiovascular: Regular rate and rhythm  Abdomen: soft, non-distended, non-tender  Musculoskeletal: Moves all extremities, no edema  Skin: warm and dry  She is examined sitting in the chair and simply pulled up her shirt.  Medially on her left breast is an irregular probably 12 mm what looks to be a seborrheic keratosis.  It is darker brown on top and lighter around the edges and the edges are slightly irregular.  She has multiple moles with a similar appearance.    Assessment and Plan: Tamela Dow is a 55 y.o. old female with multiple irregular moles.  We discussed the fact I think this most likely is a seborrheic keratosis and does not necessarily need to be excised.  We discussed observation versus excision and she thinks she will keep an eye on it.  I did suggest seeing a dermatologist for total-body evaluation which she agrees to.  Dr. Rodríguez[facility] group is in her insurance and she agrees to go see her or someone in her " group..     Matilda Panchal MD  6/24/2025       [1]   Past Medical History:  Diagnosis Date    Abnormal Pap smear of cervix     Asymptomatic menopausal state     History of menopause    Breast cyst years ago    Disease of thyroid gland     Hypertension     Other conditions influencing health status     History of pregnancy    Other conditions influencing health status     Menarche    Other specified postprocedural states     History of Papanicolaou smear    Personal history of other diseases of the musculoskeletal system and connective tissue     History of scoliosis    Personal history of other medical treatment     History of mammogram    Thyroid nodule 06/15/2023   [2]   Past Surgical History:  Procedure Laterality Date    CERVICAL BIOPSY  W/ LOOP ELECTRODE EXCISION      COLONOSCOPY  03/13/2023    REPEAT 10 YRS/ DR KRISHNA    OTHER SURGICAL HISTORY  11/20/2019    Hydatidiform mole removal    PARTIAL HYSTERECTOMY  05/2003   [3]   Allergies  Allergen Reactions    Adhesive Tape-Silicones Unknown    Penicillins Unknown    Sulfa (Sulfonamide Antibiotics) Hives   [4]   Family History  Problem Relation Name Age of Onset    Hyperlipidemia Mother Mom     Hypertension Mother Mom     Depression Mother Mom     Hypertension Father      Heart attack Father      Hypertension Maternal Grandmother      Heart disease Maternal Grandfather     [5]   Social History  Socioeconomic History    Marital status:    Tobacco Use    Smoking status: Never    Smokeless tobacco: Never   Vaping Use    Vaping status: Never Used   Substance and Sexual Activity    Alcohol use: Never    Drug use: Never    Sexual activity: Defer     Partners: Male     Birth control/protection: Female Sterilization

## 2025-07-22 ENCOUNTER — PHARMACY VISIT (OUTPATIENT)
Dept: PHARMACY | Facility: CLINIC | Age: 55
End: 2025-07-22
Payer: COMMERCIAL

## 2025-07-22 DIAGNOSIS — I10 MILD HYPERTENSION: Primary | ICD-10-CM

## 2025-07-22 PROCEDURE — RXOTC WILLOW AMBULATORY OTC CHARGE

## 2025-07-22 RX ORDER — CHLORTHALIDONE 25 MG/1
25 TABLET ORAL DAILY
Qty: 30 TABLET | Refills: 5 | Status: SHIPPED | OUTPATIENT
Start: 2025-07-22

## 2025-07-23 PROCEDURE — RXMED WILLOW AMBULATORY MEDICATION CHARGE

## 2025-07-24 ENCOUNTER — PHARMACY VISIT (OUTPATIENT)
Dept: PHARMACY | Facility: CLINIC | Age: 55
End: 2025-07-24
Payer: COMMERCIAL

## 2025-07-24 PROCEDURE — RXMED WILLOW AMBULATORY MEDICATION CHARGE

## 2025-07-30 PROCEDURE — RXMED WILLOW AMBULATORY MEDICATION CHARGE

## 2025-07-31 ENCOUNTER — PHARMACY VISIT (OUTPATIENT)
Dept: PHARMACY | Facility: CLINIC | Age: 55
End: 2025-07-31
Payer: COMMERCIAL

## 2025-07-31 DIAGNOSIS — I10 MILD HYPERTENSION: ICD-10-CM

## 2025-08-05 ENCOUNTER — RESULTS FOLLOW-UP (OUTPATIENT)
Age: 55
End: 2025-08-05
Payer: COMMERCIAL

## 2025-08-05 LAB
ANION GAP SERPL CALCULATED.4IONS-SCNC: 16 MMOL/L (CALC) (ref 7–17)
BUN SERPL-MCNC: 19 MG/DL (ref 7–25)
BUN/CREAT SERPL: ABNORMAL (CALC) (ref 6–22)
CALCIUM SERPL-MCNC: 9.7 MG/DL (ref 8.6–10.4)
CHLORIDE SERPL-SCNC: 103 MMOL/L (ref 98–110)
CO2 SERPL-SCNC: 22 MMOL/L (ref 20–32)
CREAT SERPL-MCNC: 0.77 MG/DL (ref 0.5–1.03)
EGFRCR SERPLBLD CKD-EPI 2021: 92 ML/MIN/1.73M2
GLUCOSE SERPL-MCNC: 111 MG/DL (ref 65–99)
POTASSIUM SERPL-SCNC: 4 MMOL/L (ref 3.5–5.3)
SODIUM SERPL-SCNC: 141 MMOL/L (ref 135–146)

## 2025-08-11 ENCOUNTER — PHARMACY VISIT (OUTPATIENT)
Dept: PHARMACY | Facility: CLINIC | Age: 55
End: 2025-08-11
Payer: COMMERCIAL

## 2025-08-11 PROCEDURE — RXMED WILLOW AMBULATORY MEDICATION CHARGE

## 2025-08-20 PROCEDURE — RXMED WILLOW AMBULATORY MEDICATION CHARGE

## 2025-08-22 ENCOUNTER — PHARMACY VISIT (OUTPATIENT)
Dept: PHARMACY | Facility: CLINIC | Age: 55
End: 2025-08-22
Payer: COMMERCIAL

## 2025-08-22 PROCEDURE — RXMED WILLOW AMBULATORY MEDICATION CHARGE

## 2025-12-15 ENCOUNTER — APPOINTMENT (OUTPATIENT)
Age: 55
End: 2025-12-15
Payer: COMMERCIAL

## 2026-04-30 ENCOUNTER — APPOINTMENT (OUTPATIENT)
Dept: CARDIOLOGY | Facility: CLINIC | Age: 56
End: 2026-04-30
Payer: COMMERCIAL